# Patient Record
Sex: MALE | Race: WHITE | NOT HISPANIC OR LATINO | Employment: OTHER | URBAN - METROPOLITAN AREA
[De-identification: names, ages, dates, MRNs, and addresses within clinical notes are randomized per-mention and may not be internally consistent; named-entity substitution may affect disease eponyms.]

---

## 2020-12-03 ENCOUNTER — HOSPITAL ENCOUNTER (OUTPATIENT)
Dept: GENERAL RADIOLOGY | Facility: HOSPITAL | Age: 75
Discharge: HOME | End: 2020-12-03
Admitting: INTERNAL MEDICINE

## 2020-12-03 DIAGNOSIS — M25.561 RIGHT KNEE PAIN, UNSPECIFIED CHRONICITY: ICD-10-CM

## 2020-12-03 PROCEDURE — 73560 X-RAY EXAM OF KNEE 1 OR 2: CPT

## 2021-03-09 DIAGNOSIS — Z23 IMMUNIZATION DUE: ICD-10-CM

## 2023-05-18 ENCOUNTER — HOSPITAL ENCOUNTER (OUTPATIENT)
Dept: GENERAL RADIOLOGY | Facility: HOSPITAL | Age: 78
Discharge: HOME | End: 2023-05-18
Admitting: INTERNAL MEDICINE
Payer: MEDICARE

## 2023-05-18 DIAGNOSIS — R05.9 COUGH: ICD-10-CM

## 2023-05-18 PROCEDURE — 71046 X-RAY EXAM CHEST 2 VIEWS: CPT

## 2023-08-17 ENCOUNTER — APPOINTMENT (OUTPATIENT)
Dept: GENERAL RADIOLOGY | Facility: HOSPITAL | Age: 78
End: 2023-08-17
Payer: MEDICARE

## 2023-08-17 ENCOUNTER — HOSPITAL ENCOUNTER (EMERGENCY)
Facility: HOSPITAL | Age: 78
Discharge: HOME | End: 2023-08-17
Attending: EMERGENCY MEDICINE
Payer: MEDICARE

## 2023-08-17 VITALS
HEIGHT: 72 IN | TEMPERATURE: 97.5 F | WEIGHT: 215 LBS | OXYGEN SATURATION: 90 % | HEART RATE: 85 BPM | RESPIRATION RATE: 18 BRPM | BODY MASS INDEX: 29.12 KG/M2 | SYSTOLIC BLOOD PRESSURE: 152 MMHG | DIASTOLIC BLOOD PRESSURE: 90 MMHG

## 2023-08-17 DIAGNOSIS — N17.9 AKI (ACUTE KIDNEY INJURY): ICD-10-CM

## 2023-08-17 DIAGNOSIS — K59.00 CONSTIPATION, UNSPECIFIED CONSTIPATION TYPE: Primary | ICD-10-CM

## 2023-08-17 LAB
ALBUMIN SERPL-MCNC: 4.3 G/DL (ref 3.5–5.2)
ALBUMIN/GLOB SERPL: 1.6 G/DL
ALP SERPL-CCNC: 56 U/L (ref 39–117)
ALT SERPL W P-5'-P-CCNC: 16 U/L (ref 1–41)
ANION GAP SERPL CALCULATED.3IONS-SCNC: 14.6 MMOL/L (ref 5–15)
AST SERPL-CCNC: 23 U/L (ref 1–40)
BASOPHILS # BLD AUTO: 0.06 10*3/MM3 (ref 0–0.2)
BASOPHILS NFR BLD AUTO: 0.5 % (ref 0–1.5)
BILIRUB SERPL-MCNC: 2.1 MG/DL (ref 0–1.2)
BUN SERPL-MCNC: 24 MG/DL (ref 8–23)
BUN/CREAT SERPL: 14.7 (ref 7–25)
CALCIUM SPEC-SCNC: 8.7 MG/DL (ref 8.6–10.5)
CHLORIDE SERPL-SCNC: 100 MMOL/L (ref 98–107)
CO2 SERPL-SCNC: 21.4 MMOL/L (ref 22–29)
CREAT SERPL-MCNC: 1.63 MG/DL (ref 0.76–1.27)
DEPRECATED RDW RBC AUTO: 43.8 FL (ref 37–54)
EGFRCR SERPLBLD CKD-EPI 2021: 43.4 ML/MIN/1.73
EOSINOPHIL # BLD AUTO: 0.01 10*3/MM3 (ref 0–0.4)
EOSINOPHIL NFR BLD AUTO: 0.1 % (ref 0.3–6.2)
ERYTHROCYTE [DISTWIDTH] IN BLOOD BY AUTOMATED COUNT: 12.9 % (ref 12.3–15.4)
GLOBULIN UR ELPH-MCNC: 2.7 GM/DL
GLUCOSE SERPL-MCNC: 92 MG/DL (ref 65–99)
HCT VFR BLD AUTO: 43.5 % (ref 37.5–51)
HGB BLD-MCNC: 14.7 G/DL (ref 13–17.7)
LYMPHOCYTES # BLD AUTO: 1.38 10*3/MM3 (ref 0.7–3.1)
LYMPHOCYTES NFR BLD AUTO: 11.1 % (ref 19.6–45.3)
MCH RBC QN AUTO: 31.5 PG (ref 26.6–33)
MCHC RBC AUTO-ENTMCNC: 33.8 G/DL (ref 31.5–35.7)
MCV RBC AUTO: 93.1 FL (ref 79–97)
MONOCYTES # BLD AUTO: 1.29 10*3/MM3 (ref 0.1–0.9)
MONOCYTES NFR BLD AUTO: 10.4 % (ref 5–12)
NEUTROPHILS NFR BLD AUTO: 77.5 % (ref 42.7–76)
NEUTROPHILS NFR BLD AUTO: 9.64 10*3/MM3 (ref 1.7–7)
PLATELET # BLD AUTO: 113 10*3/MM3 (ref 140–450)
PMV BLD AUTO: 10.7 FL (ref 6–12)
POTASSIUM SERPL-SCNC: 4.2 MMOL/L (ref 3.5–5.2)
PROT SERPL-MCNC: 7 G/DL (ref 6–8.5)
RBC # BLD AUTO: 4.67 10*6/MM3 (ref 4.14–5.8)
SODIUM SERPL-SCNC: 136 MMOL/L (ref 136–145)
WBC NRBC COR # BLD: 12.43 10*3/MM3 (ref 3.4–10.8)

## 2023-08-17 PROCEDURE — 74018 RADEX ABDOMEN 1 VIEW: CPT

## 2023-08-17 PROCEDURE — 80053 COMPREHEN METABOLIC PANEL: CPT | Performed by: EMERGENCY MEDICINE

## 2023-08-17 PROCEDURE — 99283 EMERGENCY DEPT VISIT LOW MDM: CPT

## 2023-08-17 PROCEDURE — 85025 COMPLETE CBC W/AUTO DIFF WBC: CPT | Performed by: EMERGENCY MEDICINE

## 2023-08-17 RX ORDER — METOPROLOL SUCCINATE 25 MG/1
12.5 TABLET, EXTENDED RELEASE ORAL DAILY
COMMUNITY

## 2023-08-17 RX ORDER — AZELASTINE 1 MG/ML
SPRAY, METERED NASAL
COMMUNITY

## 2023-08-17 RX ORDER — APIXABAN 5 MG/1
5 TABLET, FILM COATED ORAL 2 TIMES DAILY
COMMUNITY

## 2023-08-17 RX ORDER — SODIUM CHLORIDE 0.9 % (FLUSH) 0.9 %
10 SYRINGE (ML) INJECTION AS NEEDED
Status: DISCONTINUED | OUTPATIENT
Start: 2023-08-17 | End: 2023-08-17 | Stop reason: HOSPADM

## 2023-08-17 RX ORDER — ACETAMINOPHEN 500 MG
1000 TABLET ORAL
COMMUNITY
Start: 2022-04-20

## 2023-08-17 RX ORDER — ATORVASTATIN CALCIUM 10 MG/1
10 TABLET, FILM COATED ORAL DAILY
COMMUNITY

## 2023-08-17 RX ORDER — AMIODARONE HYDROCHLORIDE 200 MG/1
TABLET ORAL
COMMUNITY
Start: 2022-04-20

## 2023-08-17 RX ADMIN — SODIUM CHLORIDE, POTASSIUM CHLORIDE, SODIUM LACTATE AND CALCIUM CHLORIDE 1000 ML: 600; 310; 30; 20 INJECTION, SOLUTION INTRAVENOUS at 13:46

## 2023-08-17 NOTE — ED TRIAGE NOTES
Patient ambulatory. States he has not been able to have a bowel movement since 8/12. Patient states he has tried stool softeners, laxatives and a fleet enema with no relief. Patient states he spoke with his PCP who advised him to come here. Patient denies taking any narcotics. States he has been straining so hard he has started to pass blood.

## 2023-08-17 NOTE — ED PROVIDER NOTES
EMERGENCY DEPARTMENT ENCOUNTER    Room Number:  34/34  PCP: Drew Landry Jr., MD      HPI:  Chief Complaint: Constipation  A complete HPI/ROS/PMH/PSH/SH/FH are unobtainable due to: None  Context: Duy Valdez is a 77 y.o. male who presents to the ED c/o constipation.  Last movement was Saturday.  On Monday he had a lithotripsy.  He has tried taking over-the-counter Senokot without improvement of symptoms.  He also tried an enema earlier today without success which is why he is here.  No abdominal pain.  No vomiting.          PAST MEDICAL HISTORY  Active Ambulatory Problems     Diagnosis Date Noted    No Active Ambulatory Problems     Resolved Ambulatory Problems     Diagnosis Date Noted    No Resolved Ambulatory Problems     Past Medical History:   Diagnosis Date    Atrial fibrillation          PAST SURGICAL HISTORY  Past Surgical History:   Procedure Laterality Date    CARDIAC ABLATION      CYSTOSCOPY BLADDER STONE LITHOTRIPSY           FAMILY HISTORY  History reviewed. No pertinent family history.      SOCIAL HISTORY  Social History     Socioeconomic History    Marital status:    Tobacco Use    Smoking status: Never   Substance and Sexual Activity    Alcohol use: Never    Drug use: Never         ALLERGIES  Patient has no known allergies.        REVIEW OF SYSTEMS  Review of Systems     All systems reviewed and negative except for those discussed in HPI.       PHYSICAL EXAM  ED Triage Vitals   Temp Heart Rate Resp BP SpO2   08/17/23 1146 08/17/23 1146 08/17/23 1146 08/17/23 1203 08/17/23 1146   97.5 øF (36.4 øC) 66 18 151/78 96 %      Temp src Heart Rate Source Patient Position BP Location FiO2 (%)   08/17/23 1146 08/17/23 1146 08/17/23 1203 08/17/23 1203 --   Tympanic Monitor Sitting Left arm        Physical Exam      GENERAL: no acute distress  HENT: nares patent  EYES: no scleral icterus  CV: regular rhythm, normal rate  RESPIRATORY: normal effort  ABDOMEN: soft, nontender  MUSCULOSKELETAL: no  deformity  NEURO: alert, moves all extremities, follows commands  PSYCH:  calm, cooperative  SKIN: warm, dry    Vital signs and nursing notes reviewed.          LAB RESULTS  Recent Results (from the past 24 hour(s))   Comprehensive Metabolic Panel    Collection Time: 08/17/23  1:47 PM    Specimen: Blood   Result Value Ref Range    Glucose 92 65 - 99 mg/dL    BUN 24 (H) 8 - 23 mg/dL    Creatinine 1.63 (H) 0.76 - 1.27 mg/dL    Sodium 136 136 - 145 mmol/L    Potassium 4.2 3.5 - 5.2 mmol/L    Chloride 100 98 - 107 mmol/L    CO2 21.4 (L) 22.0 - 29.0 mmol/L    Calcium 8.7 8.6 - 10.5 mg/dL    Total Protein 7.0 6.0 - 8.5 g/dL    Albumin 4.3 3.5 - 5.2 g/dL    ALT (SGPT) 16 1 - 41 U/L    AST (SGOT) 23 1 - 40 U/L    Alkaline Phosphatase 56 39 - 117 U/L    Total Bilirubin 2.1 (H) 0.0 - 1.2 mg/dL    Globulin 2.7 gm/dL    A/G Ratio 1.6 g/dL    BUN/Creatinine Ratio 14.7 7.0 - 25.0    Anion Gap 14.6 5.0 - 15.0 mmol/L    eGFR 43.4 (L) >60.0 mL/min/1.73   CBC Auto Differential    Collection Time: 08/17/23  1:47 PM    Specimen: Blood   Result Value Ref Range    WBC 12.43 (H) 3.40 - 10.80 10*3/mm3    RBC 4.67 4.14 - 5.80 10*6/mm3    Hemoglobin 14.7 13.0 - 17.7 g/dL    Hematocrit 43.5 37.5 - 51.0 %    MCV 93.1 79.0 - 97.0 fL    MCH 31.5 26.6 - 33.0 pg    MCHC 33.8 31.5 - 35.7 g/dL    RDW 12.9 12.3 - 15.4 %    RDW-SD 43.8 37.0 - 54.0 fl    MPV 10.7 6.0 - 12.0 fL    Platelets 113 (L) 140 - 450 10*3/mm3    Neutrophil % 77.5 (H) 42.7 - 76.0 %    Lymphocyte % 11.1 (L) 19.6 - 45.3 %    Monocyte % 10.4 5.0 - 12.0 %    Eosinophil % 0.1 (L) 0.3 - 6.2 %    Basophil % 0.5 0.0 - 1.5 %    Neutrophils, Absolute 9.64 (H) 1.70 - 7.00 10*3/mm3    Lymphocytes, Absolute 1.38 0.70 - 3.10 10*3/mm3    Monocytes, Absolute 1.29 (H) 0.10 - 0.90 10*3/mm3    Eosinophils, Absolute 0.01 0.00 - 0.40 10*3/mm3    Basophils, Absolute 0.06 0.00 - 0.20 10*3/mm3       Ordered the above labs and reviewed the results.        RADIOLOGY  XR Abdomen KUB    Result Date:  8/17/2023  XR ABDOMEN KUB-  INDICATIONS: Constipation  TECHNIQUE: SUPINE VIEWS OF THE ABDOMEN  COMPARISON: None available  FINDINGS:   The bowel gas pattern is nonobstructive. No supine evidence for free intraperitoneal gas. Moderate colonic fecal retention is apparent. No definite nephrolithiasis. Follow-up as clinically indicated.       As described.   This report was finalized on 8/17/2023 1:56 PM by Dr. Kurt Hylton M.D.       Ordered the above noted radiological studies. Reviewed by me in PACS.          PROCEDURES  Procedures          MEDICATIONS GIVEN IN ER  Medications   sodium chloride 0.9 % flush 10 mL (has no administration in time range)   lactated ringers bolus 1,000 mL (0 mL Intravenous Stopped 8/17/23 1510)         MEDICAL DECISION MAKING, PROGRESS, and CONSULTS    Discussion below represents my analysis of pertinent findings related to patient's condition, differential diagnosis, treatment plan and final disposition.      Orders placed during this visit:  Orders Placed This Encounter   Procedures    XR Abdomen KUB    Comprehensive Metabolic Panel    CBC Auto Differential    Insert Peripheral IV    CBC & Differential           Differential diagnosis:    Constipation, bowel obstruction, electrolyte abnormality leading to poor bowel motility    After initial evaluation, I considered the need for admission given his acute kidney injury.        Independent interpretation of labs, radiology studies, and discussions with consultants:  ED Course as of 08/17/23 1615   Thu Aug 17, 2023   1417 X-ray of the abdomen pelvis independently interpreted by myself.  Moderate colonic stool burden noted. [TD]   1417 Hemoglobin: 14.7 [TD]   1417 WBC(!): 12.43 [TD]   1459 I discussed the case with Dr. Landry, patient's PCP.  We reviewed the patient's most recent creatinine which was 0.8.  He recommends the patient drink some Gatorade at home and he will recheck his lab work tomorrow.  He also recommends magnesium  citrate for the patient's constipation.  I have prescribed this to the patient's pharmacy. [TD]      ED Course User Index  [TD] Kurt Martin II, MD           DIAGNOSIS  Final diagnoses:   Constipation, unspecified constipation type   MEY (acute kidney injury)         DISPOSITION  DISCHARGE    FOLLOW-UP  Drew Landry Jr., MD  [address] [id number] [phone]    Go in 1 day           Medication List        New Prescriptions      magnesium citrate solution  Take 296 mL by mouth 1 (One) Time for 1 dose.               Where to Get Your Medications        These medications were sent to [facility] [address] [place] [address] ([place]), [place] - [address] [facility] - [phone]  - [phone] FX [address] [facility] ([facility]) [place] [address]      Phone: [phone]   magnesium citrate solution             Latest Documented Vital Signs:  As of 16:15 EDT  BP- 152/90 HR- 85 Temp- 97.5 øF (36.4 øC) (Tympanic) O2 sat- 90%      --    Please note that portions of this were completed with a voice recognition program.       Note Disclaimer: At [facility], we believe that sharing information builds trust and better relationships. You are receiving this note because you are receiving care at [facility] or recently visited. It is possible you will see health information before a provider has talked with you about it. This kind of information can be easy to misunderstand. To help you fully understand what it means for your health, we urge you to discuss this note with your provider.         Kurt Martin II, MD  08/17/23 9122

## 2023-08-18 ENCOUNTER — TRANSCRIBE ORDERS (OUTPATIENT)
Dept: LAB | Facility: HOSPITAL | Age: 78
End: 2023-08-18
Payer: MEDICARE

## 2023-08-18 ENCOUNTER — LAB (OUTPATIENT)
Dept: LAB | Facility: HOSPITAL | Age: 78
End: 2023-08-18
Payer: MEDICARE

## 2023-08-18 DIAGNOSIS — N28.9 DISORDER OF KIDNEY: Primary | ICD-10-CM

## 2023-08-18 DIAGNOSIS — N28.9 DISORDER OF KIDNEY: ICD-10-CM

## 2023-08-18 LAB
ALBUMIN SERPL-MCNC: 4.2 G/DL (ref 3.5–5.2)
ANION GAP SERPL CALCULATED.3IONS-SCNC: 13.2 MMOL/L (ref 5–15)
BUN SERPL-MCNC: 23 MG/DL (ref 8–23)
BUN/CREAT SERPL: 13.7 (ref 7–25)
CALCIUM SPEC-SCNC: 8.8 MG/DL (ref 8.6–10.5)
CHLORIDE SERPL-SCNC: 99 MMOL/L (ref 98–107)
CO2 SERPL-SCNC: 24.8 MMOL/L (ref 22–29)
CREAT SERPL-MCNC: 1.68 MG/DL (ref 0.76–1.27)
EGFRCR SERPLBLD CKD-EPI 2021: 41.9 ML/MIN/1.73
GLUCOSE SERPL-MCNC: 114 MG/DL (ref 65–99)
PHOSPHATE SERPL-MCNC: 2.5 MG/DL (ref 2.5–4.5)
POTASSIUM SERPL-SCNC: 5 MMOL/L (ref 3.5–5.2)
SODIUM SERPL-SCNC: 137 MMOL/L (ref 136–145)

## 2023-08-18 PROCEDURE — 80069 RENAL FUNCTION PANEL: CPT

## 2023-08-18 PROCEDURE — 36415 COLL VENOUS BLD VENIPUNCTURE: CPT

## 2023-08-31 PROCEDURE — 82365 CALCULUS SPECTROSCOPY: CPT | Performed by: UROLOGY

## 2023-09-01 ENCOUNTER — LAB REQUISITION (OUTPATIENT)
Dept: LAB | Facility: HOSPITAL | Age: 78
End: 2023-09-01
Payer: MEDICARE

## 2023-09-01 DIAGNOSIS — N20.0 CALCULUS OF KIDNEY: ICD-10-CM

## 2023-09-11 LAB
COLOR STONE: NORMAL
COM MFR STONE: 100 %
COMPN STONE: NORMAL
LABORATORY COMMENT REPORT: NORMAL
Lab: NORMAL
Lab: NORMAL
PHOTO: NORMAL
SIZE STONE: NORMAL MM
SPEC SOURCE SUBJ: NORMAL
WT STONE: 6 MG

## 2023-10-12 ENCOUNTER — TRANSCRIBE ORDERS (OUTPATIENT)
Dept: ADMINISTRATIVE | Facility: HOSPITAL | Age: 78
End: 2023-10-12
Payer: MEDICARE

## 2023-10-12 DIAGNOSIS — J98.4 RESTRICTIVE LUNG DISEASE: Primary | ICD-10-CM

## 2023-10-20 ENCOUNTER — APPOINTMENT (OUTPATIENT)
Dept: PULMONOLOGY | Facility: HOSPITAL | Age: 78
End: 2023-10-20
Payer: MEDICARE

## 2023-10-20 ENCOUNTER — HOSPITAL ENCOUNTER (OUTPATIENT)
Dept: CT IMAGING | Facility: HOSPITAL | Age: 78
Discharge: HOME | End: 2023-10-20
Admitting: INTERNAL MEDICINE
Payer: MEDICARE

## 2023-10-20 DIAGNOSIS — J98.4 RESTRICTIVE LUNG DISEASE: ICD-10-CM

## 2023-10-20 PROCEDURE — 71250 CT THORAX DX C-: CPT

## 2023-10-22 PROCEDURE — 82820 HEMOGLOBIN-OXYGEN AFFINITY: CPT

## 2023-10-23 ENCOUNTER — HOSPITAL ENCOUNTER (OUTPATIENT)
Dept: PULMONOLOGY | Facility: HOSPITAL | Age: 78
Discharge: HOME | End: 2023-10-23
Admitting: INTERNAL MEDICINE
Payer: MEDICARE

## 2023-10-23 DIAGNOSIS — J98.4 RESTRICTIVE LUNG DISEASE: ICD-10-CM

## 2023-10-23 LAB
BDY SITE: ABNORMAL
CALCULATED HEMOGLOBIN, EPOC: 14.6 G/DL
HGB BLDA-MCNC: 14.6 G/DL (ref 14–18)
Lab: ABNORMAL
SAO2 % BLDCOA: 92 % (ref 94–99)

## 2023-10-23 PROCEDURE — 94726 PLETHYSMOGRAPHY LUNG VOLUMES: CPT

## 2023-10-23 PROCEDURE — 94060 EVALUATION OF WHEEZING: CPT

## 2023-10-23 PROCEDURE — 94729 DIFFUSING CAPACITY: CPT

## 2023-10-23 RX ORDER — ALBUTEROL SULFATE 2.5 MG/3ML
2.5 SOLUTION RESPIRATORY (INHALATION) ONCE
Status: COMPLETED | OUTPATIENT
Start: 2023-10-23 | End: 2023-10-23

## 2023-10-23 RX ADMIN — ALBUTEROL SULFATE 2.5 MG: 2.5 SOLUTION RESPIRATORY (INHALATION) at 13:04

## 2024-05-15 ENCOUNTER — APPOINTMENT (OUTPATIENT)
Dept: GENERAL RADIOLOGY | Facility: HOSPITAL | Age: 79
End: 2024-05-15
Payer: MEDICARE

## 2024-05-15 ENCOUNTER — HOSPITAL ENCOUNTER (INPATIENT)
Facility: HOSPITAL | Age: 79
LOS: 2 days | Discharge: HOME | End: 2024-05-18
Attending: INTERNAL MEDICINE | Admitting: INTERNAL MEDICINE
Payer: MEDICARE

## 2024-05-15 ENCOUNTER — TRANSCRIBE ORDERS (OUTPATIENT)
Dept: ADMINISTRATIVE | Facility: HOSPITAL | Age: 79
End: 2024-05-15
Payer: MEDICARE

## 2024-05-15 ENCOUNTER — HOSPITAL ENCOUNTER (OUTPATIENT)
Dept: CT IMAGING | Facility: HOSPITAL | Age: 79
Discharge: HOME | End: 2024-05-15
Payer: MEDICARE

## 2024-05-15 DIAGNOSIS — R10.9 ABDOMINAL PAIN, UNSPECIFIED ABDOMINAL LOCATION: Primary | ICD-10-CM

## 2024-05-15 DIAGNOSIS — K81.0 ACUTE CHOLECYSTITIS: ICD-10-CM

## 2024-05-15 DIAGNOSIS — K81.9 CHOLECYSTITIS: Primary | ICD-10-CM

## 2024-05-15 DIAGNOSIS — R10.9 ABDOMINAL PAIN, UNSPECIFIED ABDOMINAL LOCATION: ICD-10-CM

## 2024-05-15 LAB
ALBUMIN SERPL-MCNC: 4.2 G/DL (ref 3.5–5.2)
ALBUMIN/GLOB SERPL: 1.6 G/DL
ALP SERPL-CCNC: 147 U/L (ref 39–117)
ALT SERPL W P-5'-P-CCNC: 205 U/L (ref 1–41)
AMYLASE SERPL-CCNC: 77 U/L (ref 28–100)
ANION GAP SERPL CALCULATED.3IONS-SCNC: 8.5 MMOL/L (ref 5–15)
ANION GAP SERPL CALCULATED.3IONS-SCNC: 8.5 MMOL/L (ref 5–15)
AST SERPL-CCNC: 176 U/L (ref 1–40)
BASOPHILS # BLD AUTO: 0.07 10*3/MM3 (ref 0–0.2)
BASOPHILS NFR BLD AUTO: 0.8 % (ref 0–1.5)
BILIRUB SERPL-MCNC: 2 MG/DL (ref 0–1.2)
BUN SERPL-MCNC: 13 MG/DL (ref 8–23)
BUN SERPL-MCNC: 13 MG/DL (ref 8–23)
BUN/CREAT SERPL: 15.7 (ref 7–25)
BUN/CREAT SERPL: 15.7 (ref 7–25)
CALCIUM SPEC-SCNC: 9.1 MG/DL (ref 8.6–10.5)
CALCIUM SPEC-SCNC: 9.1 MG/DL (ref 8.6–10.5)
CHLORIDE SERPL-SCNC: 104 MMOL/L (ref 98–107)
CHLORIDE SERPL-SCNC: 104 MMOL/L (ref 98–107)
CO2 SERPL-SCNC: 23.5 MMOL/L (ref 22–29)
CO2 SERPL-SCNC: 23.5 MMOL/L (ref 22–29)
CREAT BLDA-MCNC: 0.7 MG/DL (ref 0.6–1.3)
CREAT SERPL-MCNC: 0.83 MG/DL (ref 0.76–1.27)
CREAT SERPL-MCNC: 0.83 MG/DL (ref 0.76–1.27)
DEPRECATED RDW RBC AUTO: 39.8 FL (ref 37–54)
EGFRCR SERPLBLD CKD-EPI 2021: 90.1 ML/MIN/1.73
EGFRCR SERPLBLD CKD-EPI 2021: 90.1 ML/MIN/1.73
EOSINOPHIL # BLD AUTO: 0.1 10*3/MM3 (ref 0–0.4)
EOSINOPHIL NFR BLD AUTO: 1.2 % (ref 0.3–6.2)
ERYTHROCYTE [DISTWIDTH] IN BLOOD BY AUTOMATED COUNT: 12.2 % (ref 12.3–15.4)
GLOBULIN UR ELPH-MCNC: 2.7 GM/DL
GLUCOSE SERPL-MCNC: 82 MG/DL (ref 65–99)
GLUCOSE SERPL-MCNC: 82 MG/DL (ref 65–99)
HCT VFR BLD AUTO: 43.6 % (ref 37.5–51)
HGB BLD-MCNC: 14.9 G/DL (ref 13–17.7)
IMM GRANULOCYTES # BLD AUTO: 0.08 10*3/MM3 (ref 0–0.05)
IMM GRANULOCYTES NFR BLD AUTO: 1 % (ref 0–0.5)
LIPASE SERPL-CCNC: 31 U/L (ref 13–60)
LYMPHOCYTES # BLD AUTO: 1.35 10*3/MM3 (ref 0.7–3.1)
LYMPHOCYTES NFR BLD AUTO: 16.3 % (ref 19.6–45.3)
MCH RBC QN AUTO: 31.2 PG (ref 26.6–33)
MCHC RBC AUTO-ENTMCNC: 34.2 G/DL (ref 31.5–35.7)
MCV RBC AUTO: 91.2 FL (ref 79–97)
MONOCYTES # BLD AUTO: 0.7 10*3/MM3 (ref 0.1–0.9)
MONOCYTES NFR BLD AUTO: 8.5 % (ref 5–12)
NEUTROPHILS NFR BLD AUTO: 5.98 10*3/MM3 (ref 1.7–7)
NEUTROPHILS NFR BLD AUTO: 72.2 % (ref 42.7–76)
PLATELET # BLD AUTO: 121 10*3/MM3 (ref 140–450)
PMV BLD AUTO: 10.5 FL (ref 6–12)
POTASSIUM SERPL-SCNC: 4.2 MMOL/L (ref 3.5–5.2)
POTASSIUM SERPL-SCNC: 4.2 MMOL/L (ref 3.5–5.2)
PROT SERPL-MCNC: 6.9 G/DL (ref 6–8.5)
RBC # BLD AUTO: 4.78 10*6/MM3 (ref 4.14–5.8)
SODIUM SERPL-SCNC: 136 MMOL/L (ref 136–145)
SODIUM SERPL-SCNC: 136 MMOL/L (ref 136–145)
WBC NRBC COR # BLD AUTO: 8.28 10*3/MM3 (ref 3.4–10.8)

## 2024-05-15 PROCEDURE — 85025 COMPLETE CBC W/AUTO DIFF WBC: CPT | Performed by: INTERNAL MEDICINE

## 2024-05-15 PROCEDURE — 82150 ASSAY OF AMYLASE: CPT | Performed by: INTERNAL MEDICINE

## 2024-05-15 PROCEDURE — G0378 HOSPITAL OBSERVATION PER HR: HCPCS

## 2024-05-15 PROCEDURE — 83690 ASSAY OF LIPASE: CPT | Performed by: INTERNAL MEDICINE

## 2024-05-15 PROCEDURE — 71046 X-RAY EXAM CHEST 2 VIEWS: CPT

## 2024-05-15 PROCEDURE — 25010000002 PIPERACILLIN SOD-TAZOBACTAM PER 1 G: Performed by: INTERNAL MEDICINE

## 2024-05-15 PROCEDURE — 82565 ASSAY OF CREATININE: CPT

## 2024-05-15 PROCEDURE — 87040 BLOOD CULTURE FOR BACTERIA: CPT | Performed by: INTERNAL MEDICINE

## 2024-05-15 PROCEDURE — 74177 CT ABD & PELVIS W/CONTRAST: CPT

## 2024-05-15 PROCEDURE — 25810000003 SODIUM CHLORIDE 0.9 % SOLUTION: Performed by: INTERNAL MEDICINE

## 2024-05-15 PROCEDURE — 93005 ELECTROCARDIOGRAM TRACING: CPT | Performed by: INTERNAL MEDICINE

## 2024-05-15 PROCEDURE — 25510000001 IOPAMIDOL 61 % SOLUTION: Performed by: INTERNAL MEDICINE

## 2024-05-15 PROCEDURE — 93010 ELECTROCARDIOGRAM REPORT: CPT | Performed by: INTERNAL MEDICINE

## 2024-05-15 PROCEDURE — 80053 COMPREHEN METABOLIC PANEL: CPT | Performed by: INTERNAL MEDICINE

## 2024-05-15 RX ORDER — AMLODIPINE BESYLATE 2.5 MG/1
2.5 TABLET ORAL DAILY
COMMUNITY

## 2024-05-15 RX ORDER — ATORVASTATIN CALCIUM 20 MG/1
80 TABLET, FILM COATED ORAL DAILY
Status: DISCONTINUED | OUTPATIENT
Start: 2024-05-16 | End: 2024-05-18 | Stop reason: HOSPADM

## 2024-05-15 RX ORDER — METOPROLOL SUCCINATE 25 MG/1
25 TABLET, EXTENDED RELEASE ORAL
Status: DISCONTINUED | OUTPATIENT
Start: 2024-05-15 | End: 2024-05-18 | Stop reason: HOSPADM

## 2024-05-15 RX ORDER — SODIUM CHLORIDE 0.9 % (FLUSH) 0.9 %
10 SYRINGE (ML) INJECTION AS NEEDED
Status: DISCONTINUED | OUTPATIENT
Start: 2024-05-15 | End: 2024-05-18 | Stop reason: HOSPADM

## 2024-05-15 RX ORDER — FLUTICASONE FUROATE 100 UG/1
1 POWDER RESPIRATORY (INHALATION) DAILY
COMMUNITY

## 2024-05-15 RX ORDER — ALBUTEROL SULFATE 2.5 MG/3ML
2.5 SOLUTION RESPIRATORY (INHALATION) EVERY 6 HOURS PRN
Status: DISCONTINUED | OUTPATIENT
Start: 2024-05-15 | End: 2024-05-18 | Stop reason: HOSPADM

## 2024-05-15 RX ORDER — SODIUM CHLORIDE 0.9 % (FLUSH) 0.9 %
10 SYRINGE (ML) INJECTION EVERY 12 HOURS SCHEDULED
Status: DISCONTINUED | OUTPATIENT
Start: 2024-05-15 | End: 2024-05-18 | Stop reason: HOSPADM

## 2024-05-15 RX ORDER — ACETAMINOPHEN 325 MG/1
650 TABLET ORAL EVERY 4 HOURS PRN
Status: DISCONTINUED | OUTPATIENT
Start: 2024-05-15 | End: 2024-05-18 | Stop reason: HOSPADM

## 2024-05-15 RX ORDER — SODIUM CHLORIDE 9 MG/ML
40 INJECTION, SOLUTION INTRAVENOUS AS NEEDED
Status: DISCONTINUED | OUTPATIENT
Start: 2024-05-15 | End: 2024-05-18 | Stop reason: HOSPADM

## 2024-05-15 RX ORDER — HYDROMORPHONE HYDROCHLORIDE 1 MG/ML
0.5 INJECTION, SOLUTION INTRAMUSCULAR; INTRAVENOUS; SUBCUTANEOUS
Status: DISCONTINUED | OUTPATIENT
Start: 2024-05-15 | End: 2024-05-18 | Stop reason: HOSPADM

## 2024-05-15 RX ORDER — ONDANSETRON 2 MG/ML
4 INJECTION INTRAMUSCULAR; INTRAVENOUS EVERY 6 HOURS PRN
Status: DISCONTINUED | OUTPATIENT
Start: 2024-05-15 | End: 2024-05-18 | Stop reason: HOSPADM

## 2024-05-15 RX ORDER — SODIUM CHLORIDE 9 MG/ML
75 INJECTION, SOLUTION INTRAVENOUS CONTINUOUS
Status: DISCONTINUED | OUTPATIENT
Start: 2024-05-15 | End: 2024-05-16

## 2024-05-15 RX ORDER — NALOXONE HCL 0.4 MG/ML
0.4 VIAL (ML) INJECTION
Status: DISCONTINUED | OUTPATIENT
Start: 2024-05-15 | End: 2024-05-18 | Stop reason: HOSPADM

## 2024-05-15 RX ADMIN — METOPROLOL SUCCINATE 12.5 MG: 25 TABLET, EXTENDED RELEASE ORAL at 23:14

## 2024-05-15 RX ADMIN — Medication 10 ML: at 23:15

## 2024-05-15 RX ADMIN — SODIUM CHLORIDE 75 ML/HR: 9 INJECTION, SOLUTION INTRAVENOUS at 23:14

## 2024-05-15 RX ADMIN — IOPAMIDOL 85 ML: 612 INJECTION, SOLUTION INTRAVENOUS at 17:14

## 2024-05-15 RX ADMIN — PIPERACILLIN AND TAZOBACTAM 3.38 G: 3; .375 INJECTION, POWDER, FOR SOLUTION INTRAVENOUS at 23:16

## 2024-05-16 ENCOUNTER — APPOINTMENT (OUTPATIENT)
Dept: ULTRASOUND IMAGING | Facility: HOSPITAL | Age: 79
End: 2024-05-16
Payer: MEDICARE

## 2024-05-16 PROBLEM — K81.0 ACUTE CHOLECYSTITIS: Status: ACTIVE | Noted: 2024-05-16

## 2024-05-16 LAB
QT INTERVAL: 402 MS
QTC INTERVAL: 455 MS

## 2024-05-16 PROCEDURE — 76705 ECHO EXAM OF ABDOMEN: CPT

## 2024-05-16 PROCEDURE — 99223 1ST HOSP IP/OBS HIGH 75: CPT | Performed by: SURGERY

## 2024-05-16 PROCEDURE — 25010000002 PIPERACILLIN SOD-TAZOBACTAM PER 1 G: Performed by: INTERNAL MEDICINE

## 2024-05-16 RX ADMIN — Medication 10 ML: at 21:38

## 2024-05-16 RX ADMIN — Medication 10 ML: at 08:37

## 2024-05-16 RX ADMIN — PIPERACILLIN AND TAZOBACTAM 3.38 G: 3; .375 INJECTION, POWDER, FOR SOLUTION INTRAVENOUS at 16:52

## 2024-05-16 RX ADMIN — ATORVASTATIN CALCIUM 80 MG: 20 TABLET, FILM COATED ORAL at 08:37

## 2024-05-16 NOTE — PLAN OF CARE
Goal Outcome Evaluation:           Progress: no change  Patient admitted overnight. NS infusing at 75, IV abx administered. No complaints of pain overnight. CXR and EKG obtained per orders. US of gallbladder done this am. Pt NPO since midnight. SCDs on. Wife at bedside. Vitals stable on RA.

## 2024-05-16 NOTE — PROGRESS NOTES
[facility] Clinical Pharmacy Services: Piperacillin-Tazobactam Consult    Pt Name: Duy Valdez   : 1945    Indication: Intra-Abdominal Infection    Relevant clinical data and objective history reviewed:    Past Medical History:   Diagnosis Date    Atrial fibrillation      Creatinine   Date Value Ref Range Status   05/15/2024 0.70 0.60 - 1.30 mg/dL Final     Comment:     Serial Number: [id number]:  [id number]   2023 1.68 (H) 0.76 - 1.27 mg/dL Final   2023 1.63 (H) 0.76 - 1.27 mg/dL Final   2018 0.7 0.7 - 1.5 mg/dL Final   2018 0.9 0.7 - 1.5 mg/dL Final   2018 0.7 0.7 - 1.5 mg/dL Final     BUN   Date Value Ref Range Status   2023 23 8 - 23 mg/dL Final   2018 20 7 - 20 mg/dL Final     Estimated Creatinine Clearance: 107 mL/min (by C-G formula based on SCr of 0.7 mg/dL).    Lab Results   Component Value Date    WBC 12.43 (H) 2023     Temp Readings from Last 3 Encounters:   23 97.5 °F (36.4 °C) (Tympanic)      Assessment/Plan  Estimated CrCl >20 mL/min at this time; BMI 29.15 kg/m2  Will start piperacillin-tazobactam 3.375 g IV every 8 hours     Pharmacy will continue to follow daily while on piperacillin-tazobactam and adjust as needed. Thank you for this consult.    Denys Bailey [facility]  Clinical Pharmacist

## 2024-05-16 NOTE — H&P (VIEW-ONLY)
General Surgery H&P/Consultation      Impression/Plan: 78-year-old gentleman with signs and symptoms most consistent with acute cholecystitis.  His pain is improving and based on his transaminases and bilirubin trend I suspect he could have passed a stone.  I have recommended proceeding with laparoscopic cholecystectomy with intraoperative cholangiogram.  I recommend doing this on 5/17/2024 to allow 48 hours from last dose of Eliquis.  Risk and rationale for the surgery were discussed with him and he is in agreement with proceeding.  He is okay for a clear liquid diet today.  I will make him n.p.o. after midnight.    CC: Abdominal pain    HPI:   Mr. Duy Valdez is a 78 y.o. male that presented to the hospital with acute onset of upper abdominal pain at 7 AM yesterday.  It started in the epigastrium with some radiation to the left upper quadrant.  It was initially severe in intensity and then started to ease up.  He underwent a CT scan as an outpatient concerning for cholecystitis.  He was admitted to the hospital by Dr. Landry for further management.  Currently his pain is much improved.  Denies any nausea or emesis.  He takes Eliquis for history of atrial fibrillation.  His last dose was yesterday morning.    Past Medical History:   Past Medical History:   Diagnosis Date    Atrial fibrillation        Past Surgical History:   Past Surgical History:   Procedure Laterality Date    CARDIAC ABLATION      CYSTOSCOPY BLADDER STONE LITHOTRIPSY         Medications:  Medications Prior to Admission   Medication Sig Dispense Refill Last Dose    acetaminophen (TYLENOL) 500 MG tablet 2 tablets.   Past Week    amLODIPine (NORVASC) 2.5 MG tablet Take 1 tablet by mouth Daily.   5/15/2024 at 0700    atorvastatin (LIPITOR) 10 MG tablet Take 8 tablets by mouth Daily.   5/15/2024 at 0700    azelastine (ASTELIN) 0.1 % nasal spray azelastine 137 mcg (0.1 %) nasal spray aerosol   USE 1 SPRAY IN EACH NOSTRIL TWICE DAILY   5/14/2024     Cholecalciferol 125 MCG (5000 UT) tablet cholecalciferol (vitamin D3) 125 mcg (5,000 unit) tablet   TAKE 1 TABLET BY MOUTH ONCE DAILY   5/15/2024 at 0700    cyanocobalamin (VITAMIN B-12) 250 MCG tablet B12   5/15/2024 at 0700    Eliquis 5 MG tablet tablet Take 1 tablet by mouth 2 (Two) Times a Day.   5/15/2024 at 0700    Fluticasone Furoate (Arnuity Ellipta) 100 MCG/ACT aerosol powder  Inhale 1 puff Daily.   5/15/2024 at 0700    metoprolol succinate XL (TOPROL-XL) 25 MG 24 hr tablet 0.5 tablets Daily.   5/14/2024    amiodarone (PACERONE) 200 MG tablet amiodarone 200 mg tablet   TAKE 1 TABLET BY MOUTH ONCE DAILY            Current Facility-Administered Medications:     acetaminophen (TYLENOL) tablet 650 mg, 650 mg, Oral, Q4H PRN, Drew Landry Jr., MD    albuterol (PROVENTIL) nebulizer solution 0.083% 2.5 mg/3mL, 2.5 mg, Nebulization, Q6H PRN, Drew Landry Jr., MD    atorvastatin (LIPITOR) tablet 80 mg, 80 mg, Oral, Daily, Drew Landry Jr., MD    Calcium Replacement - Follow Nurse / BPA Driven Protocol, , Does not apply, PRN, Drew Landry Jr., MD    HYDROmorphone (DILAUDID) injection 0.5 mg, 0.5 mg, Intravenous, Q2H PRN **AND** naloxone (NARCAN) injection 0.4 mg, 0.4 mg, Intravenous, Q5 Min PRN, Drew Landry Jr., MD    Magnesium Standard Dose Replacement - Follow Nurse / BPA Driven Protocol, , Does not apply, PRNGris Felix Albert Jr., MD    metoprolol succinate XL (TOPROL-XL) 24 hr tablet 25 mg, 25 mg, Oral, Q24H, Drew Landry Jr., MD, 12.5 mg at 05/15/24 2314    ondansetron (ZOFRAN) injection 4 mg, 4 mg, Intravenous, Q6H PRN, Drew Landry Jr., MD    Pharmacy to Dose Zosyn, , Does not apply, Continuous PRN, Drew Landry Jr., MD    Phosphorus Replacement - Follow Nurse / BPA Driven Protocol, , Does not apply, PRNGris Felix Albert Jr., MD    piperacillin-tazobactam (ZOSYN) 3.375 g IVPB in 100 mL NS MBP (CD), 3.375 g, Intravenous, Q8H, Drew Landry  MD Lencho    Potassium Replacement - Follow Nurse / BPA Driven Protocol, , Does not apply, Gris RICH Felix Albert Jr., MD    sodium chloride 0.9 % flush 10 mL, 10 mL, Intravenous, Q12H, Drew Landry Jr., MD, 10 mL at 05/15/24 2315    sodium chloride 0.9 % flush 10 mL, 10 mL, Intravenous, PRNGris Felix Albert Jr., MD    sodium chloride 0.9 % infusion 40 mL, 40 mL, Intravenous, PRNGris Felix Albert Jr., MD     Allergies: No Known Allergies    Social History:   Social History     Socioeconomic History    Marital status:    Tobacco Use    Smoking status: Former     Types: Cigarettes   Vaping Use    Vaping status: Never Used   Substance and Sexual Activity    Alcohol use: Never    Drug use: Never       Family History:   History reviewed. No pertinent family history.    Review of Systems:  Clinically relevant review of systems completed and documented in HPI    Physical Exam:   Vitals:    05/16/24 0528   BP: 121/69   Pulse: 57   Resp: 16   Temp: 98.1 °F (36.7 °C)   SpO2: 98%     BMI: Body mass index is 29.98 kg/m².   97.5 kg (214 lb 15.2 oz)      Intake/Output Summary (Last 24 hours) at 5/16/2024 0756  Last data filed at 5/16/2024 0655  Gross per 24 hour   Intake 816.25 ml   Output 250 ml   Net 566.25 ml       GENERAL: no acute distress, awake and alert  RESPIRATORY: symmetric excursion bilaterally, normal work of breathing  CARDIOVASCULAR: Regular rate, well perfused  GASTROINTESTINAL: Soft, minimal tenderness in the upper abdomen, nondistended      Pertinent labs:   Results from last 7 days   Lab Units 05/15/24  2219   WBC 10*3/mm3 8.28   HEMOGLOBIN g/dL 14.9   HEMATOCRIT % 43.6   PLATELETS 10*3/mm3 121*     Results from last 7 days   Lab Units 05/15/24  2219 05/15/24  1710   SODIUM mmol/L 136  136  --    POTASSIUM mmol/L 4.2  4.2  --    CHLORIDE mmol/L 104  104  --    CO2 mmol/L 23.5  23.5  --    BUN mg/dL 13  13  --    CREATININE mg/dL 0.83  0.83 0.70   CALCIUM mg/dL 9.1  9.1  --     BILIRUBIN mg/dL 2.0*  --    ALK PHOS U/L 147*  --    ALT (SGPT) U/L 205*  --    AST (SGOT) U/L 176*  --    GLUCOSE mg/dL 82  82  --        IMAGING:  CT abdomen pelvis reviewed.  I compared this to the CT chest from 10/20/2023.  There is edema around the wall of the gallbladder which is new compared to the previous scan.  Remainder of findings and the radiology report reviewed and no acute abnormalities otherwise.              Fabio Foster MD  General and Endoscopic Surgery  [facility]    [address], [address]  [place] [address]  P: [phone]  F: [phone]

## 2024-05-16 NOTE — PLAN OF CARE
Goal Outcome Evaluation:  Plan of Care Reviewed With: patient        Progress: no change     Pt AxOx4, calm and cooperative. Takes pills whole with water, see MAR. Pt sitting up in the recliner most of the day, stand by assist to the bathroom. Family visiting this afternoon, attentive to pt. Plan NPO after Midnight for possible surgery tomorrow. Plan of care ongoing.

## 2024-05-16 NOTE — PROGRESS NOTES
History:   Patient resting comfortably.   Did not require any pain or nausea meds last night.     Allergies  Patient has no known allergies.      Current Facility-Administered Medications:     acetaminophen (TYLENOL) tablet 650 mg, 650 mg, Oral, Q4H PRN, Drew Landry Jr., MD    albuterol (PROVENTIL) nebulizer solution 0.083% 2.5 mg/3mL, 2.5 mg, Nebulization, Q6H PRN, Drew Landry Jr., MD    atorvastatin (LIPITOR) tablet 80 mg, 80 mg, Oral, Daily, Drew Landry Jr., MD    Calcium Replacement - Follow Nurse / BPA Driven Protocol, , Does not apply, PRNGris Felix Albert Jr., MD    HYDROmorphone (DILAUDID) injection 0.5 mg, 0.5 mg, Intravenous, Q2H PRN **AND** naloxone (NARCAN) injection 0.4 mg, 0.4 mg, Intravenous, Q5 Min PRN, Drew Landry Jr., MD    Magnesium Standard Dose Replacement - Follow Nurse / BPA Driven Protocol, , Does not apply, PRN, Drew Landry Jr., MD    metoprolol succinate XL (TOPROL-XL) 24 hr tablet 25 mg, 25 mg, Oral, Q24H, Drew Landry Jr., MD, 12.5 mg at 05/15/24 2314    ondansetron (ZOFRAN) injection 4 mg, 4 mg, Intravenous, Q6H PRN, Drew Landry Jr., MD    Pharmacy to Dose Zosyn, , Does not apply, Continuous PRN, Drew Landry Jr., MD    Phosphorus Replacement - Follow Nurse / BPA Driven Protocol, , Does not apply, PRN, Drew Landry Jr., MD    piperacillin-tazobactam (ZOSYN) 3.375 g IVPB in 100 mL NS MBP (CD), 3.375 g, Intravenous, Q8H, Drew Landry Jr., MD    Potassium Replacement - Follow Nurse / BPA Driven Protocol, , Does not apply, PRNGris Felix Albert Jr., MD    sodium chloride 0.9 % flush 10 mL, 10 mL, Intravenous, Q12H, Drew Landry Jr., MD, 10 mL at 05/15/24 2315    sodium chloride 0.9 % flush 10 mL, 10 mL, Intravenous, PRN, Drew Landry Jr., MD    sodium chloride 0.9 % infusion 40 mL, 40 mL, Intravenous, PRN, Drew Landry Jr., MD    /69 (BP Location: Right arm, Patient Position: Lying)   " Pulse 57   Temp 98.1 °F (36.7 °C) (Oral)   Resp 16   Ht 180.3 cm (71\")   Wt 97.5 kg (214 lb 15.2 oz)   SpO2 98%   BMI 29.98 kg/m²     Physical Exam  General: Very pleasant elderly white male in NAD  Neck: JVD physiologic.  No lymphadenopathy.  Heart: Regular rate, irregularly irregular rhythm.  Lungs: Clear to auscultation.  Abdomen: Protuberant, soft, bowel sounds are present.  Nontender. No rebound or guarding.  Back: No percussion tenderness of the spine.  Skin: No rash.  Numerous scattered seborrheic keratoses over the back.  Extremities: No edema.  Osteoarthritic changes.  Neurologic exam: Nonfocal  Lab Results (last 24 hours)       Procedure Component Value Units Date/Time    Comprehensive Metabolic Panel [[id number]]  (Abnormal) Collected: 05/15/24 2219    Specimen: Blood Updated: 05/15/24 2256     Glucose 82 mg/dL      BUN 13 mg/dL      Creatinine 0.83 mg/dL      Sodium 136 mmol/L      Potassium 4.2 mmol/L      Chloride 104 mmol/L      CO2 23.5 mmol/L      Calcium 9.1 mg/dL      Total Protein 6.9 g/dL      Albumin 4.2 g/dL      ALT (SGPT) 205 U/L      AST (SGOT) 176 U/L      Alkaline Phosphatase 147 U/L      Total Bilirubin 2.0 mg/dL      Globulin 2.7 gm/dL      A/G Ratio 1.6 g/dL      BUN/Creatinine Ratio 15.7     Anion Gap 8.5 mmol/L      eGFR 90.1 mL/min/1.73     Narrative:      GFR Normal >60  Chronic Kidney Disease <60  Kidney Failure <15    The GFR formula is only valid for adults with stable renal function between ages 18 and 70.    Lipase [[id number]]  (Normal) Collected: 05/15/24 2219    Specimen: Blood Updated: 05/15/24 2256     Lipase 31 U/L     Amylase [[id number]]  (Normal) Collected: 05/15/24 2219    Specimen: Blood Updated: 05/15/24 2256     Amylase 77 U/L     Basic Metabolic Panel [[id number]]  (Normal) Collected: 05/15/24 2219    Specimen: Blood Updated: 05/15/24 2256     Glucose 82 mg/dL      BUN 13 mg/dL      Creatinine 0.83 mg/dL      Sodium 136 mmol/L      Potassium 4.2 mmol/L      " Chloride 104 mmol/L      CO2 23.5 mmol/L      Calcium 9.1 mg/dL      BUN/Creatinine Ratio 15.7     Anion Gap 8.5 mmol/L      eGFR 90.1 mL/min/1.73     Narrative:      GFR Normal >60  Chronic Kidney Disease <60  Kidney Failure <15    The GFR formula is only valid for adults with stable renal function between ages 18 and 70.    CBC Auto Differential [[id number]]  (Abnormal) Collected: 05/15/24 2219    Specimen: Blood Updated: 05/15/24 2249     WBC 8.28 10*3/mm3      RBC 4.78 10*6/mm3      Hemoglobin 14.9 g/dL      Hematocrit 43.6 %      MCV 91.2 fL      MCH 31.2 pg      MCHC 34.2 g/dL      RDW 12.2 %      RDW-SD 39.8 fl      MPV 10.5 fL      Platelets 121 10*3/mm3      Neutrophil % 72.2 %      Lymphocyte % 16.3 %      Monocyte % 8.5 %      Eosinophil % 1.2 %      Basophil % 0.8 %      Immature Grans % 1.0 %      Neutrophils, Absolute 5.98 10*3/mm3      Lymphocytes, Absolute 1.35 10*3/mm3      Monocytes, Absolute 0.70 10*3/mm3      Eosinophils, Absolute 0.10 10*3/mm3      Basophils, Absolute 0.07 10*3/mm3      Immature Grans, Absolute 0.08 10*3/mm3     Blood Culture - Blood, Arm, Left [[id number]] Collected: 05/15/24 2219    Specimen: Blood from Arm, Left Updated: 05/15/24 2226    Blood Culture - Blood, Arm, Right [[id number]] Collected: 05/15/24 2221    Specimen: Blood from Arm, Right Updated: 05/15/24 2226          CT ABD / PELVIS  IMPRESSION:  1. Gallbladder wall thickening with cholelithiasis. This may represent  cholecystitis and findings need correlation with clinical data and could  be further evaluated with right upper quadrant sonogram.  2. Multiple 1 cm and smaller hepatic low-density lesions are most likely  cysts or hemangiomas though not definitively characterized. 1 cm lesion  superior aspect of the lateral segment left lobe of the liver not  clearly demonstrated on previous CT 10/20/2023.  3. Multiple subcentimeter renal cysts and renal lesions that are too  small to characterize.  4. Three 7 mm and smaller  right lower lobe noncalcified pulmonary  nodules, new when compared to CT chest 10/20/2023. These are most likely  inflammatory or infectious in etiology though recommend follow-up with  noncontrast chest CT in 2 to 3 months.  5. Prostate gland enlargement.  6. Chronic-appearing compression deformity at L5. Previous  posterolateral instrumented fusion at L3-4.          Imp:  Cholecystitis with elevated LFTs;  Appreciate Dr. Foster's expertise. Eliquis on hold.  Surgery scheduled for tomorrow.    Hypertension, controlled.    Atrial Fib;  Rate controlled.  Eliquis held    New Noncalcified RLL Pulmonary Nodules;  Will require followup CT scan in 2-3  months      Plan:  Continue Zosyn  Clear liquid diet.  No eliquis.  NPO after midnight.  Surgery tomorrow.       Drew Landry Jr., MD  5/16/2024  07:54 EDT

## 2024-05-16 NOTE — H&P
History and physical admission note: Observation status    Chief complaint: Abdominal pain    HPI: 78-year-old male with history of paroxysmal atrial fibrillation, hypertension, kidney stones, and obesity presented to my office today with the acute onset of abdominal pain.  He felt great last night when he went to bed.  However this morning at 7 AM he awoke with severe pain in his upper abdomen, above the umbilicus.  It was in the epigastric area and to the left upper quadrant.  It was a crampy severe pain.  He describes the pain as 8/10 in intensity.  There was no nausea or vomiting.  No diarrhea or constipation.  Did not feel like indigestion.  It would not go away.  When he presented to the office he was still having discomfort that was 5/10 in intensity.  Physical exam revealed mild upper abdominal tenderness.  No rebound or guarding.  Urinalysis was clear.  He was given Pepcid AC in the office and this had no effect on the pain.  We sent him for CT scan of the abdomen.    CT scan is suggestive of cholelithiasis with possible cholecystitis.  At the present time (7 PM) the pain is very mild, but still there.  He requires observation stay.       Allergies  Patient has no known allergies.      HOME MEDICATIONS:  Albuterol HFA MDI inhaler; 2 puffs every 4 hours as needed  Amlodipine 2.5 mg p.o. daily  Arnuity elliptica 100 mcg 1 puff daily  Atorvastatin 80 mg p.o. daily  Astelin nasal spray as needed.  Vitamin D3 5000 units daily  Eliquis 5 mg every 12 hours (last dose was this morning)  Meclizine 12.5 every 4 hours as needed dizziness  Metoprolol succinate ER 25 mg; 1/2 tablet nightly      Current Facility-Administered Medications:     acetaminophen (TYLENOL) tablet 650 mg, 650 mg, Oral, Q4H PRN, Drew Landry Jr., MD    albuterol (PROVENTIL) nebulizer solution 0.083% 2.5 mg/3mL, 2.5 mg, Nebulization, Q6H PRN, Drew Landry Jr., MD    [START ON 5/16/2024] atorvastatin (LIPITOR) tablet 80 mg, 80 mg,  Oral, Daily, Drew Landry Jr., MD    Calcium Replacement - Follow Nurse / BPA Driven Protocol, , Does not apply, PRNGris Felix Albert Jr., MD    HYDROmorphone (DILAUDID) injection 0.5 mg, 0.5 mg, Intravenous, Q2H PRN **AND** naloxone (NARCAN) injection 0.4 mg, 0.4 mg, Intravenous, Q5 Min PRN, Drew Landry Jr., MD    Magnesium Standard Dose Replacement - Follow Nurse / BPA Driven Protocol, , Does not apply, PRNGris Felix Albert Jr., MD    metoprolol succinate XL (TOPROL-XL) 24 hr tablet 25 mg, 25 mg, Oral, Q24H, Drew Landry Jr., MD    ondansetron (ZOFRAN) injection 4 mg, 4 mg, Intravenous, Q6H PRN, Drew Landry Jr., MD    Pharmacy to Dose Zosyn, , Does not apply, Continuous PRN, Drew Landry Jr., MD    Phosphorus Replacement - Follow Nurse / BPA Driven Protocol, , Does not apply, PRNGris Felix Albert Jr., MD    piperacillin-tazobactam (ZOSYN) 3.375 g IVPB in 100 mL NS MBP (CD), 3.375 g, Intravenous, Once, Drew Landry Jr., MD    [START ON 5/16/2024] piperacillin-tazobactam (ZOSYN) 3.375 g IVPB in 100 mL NS MBP (CD), 3.375 g, Intravenous, Q8H, Drew Landry Jr., MD    Potassium Replacement - Follow Nurse / BPA Driven Protocol, , Does not apply, PRN, Drew Landry Jr., MD    sodium chloride 0.9 % flush 10 mL, 10 mL, Intravenous, Q12H, Drew Landry Jr., MD    sodium chloride 0.9 % flush 10 mL, 10 mL, Intravenous, PRN, Drew Landry Jr., MD    sodium chloride 0.9 % infusion 40 mL, 40 mL, Intravenous, PRNGris Felix Albert Jr., MD    sodium chloride 0.9 % infusion, 75 mL/hr, Intravenous, Continuous, Drew Landry Jr., MD        MEDICAL PROBLEMS:    Acute cholecystitis; atypical abdominal pain with CT scan suggestive of cholecystitis.  See above    PRESLEY; patient recently diagnosed with sleep apnea.  Mild-moderate sleep apnea.  He is being set up for CPAP.    Hypertension    Calcium oxalate renal stones; patient had stent placement in  October 2023.  This has since been removed.  Followed by Dr. Hammer, urology    Obesity    Age-related macular degeneration left eye; followed by Dr. Marquez, ophthalmology    BPH; followed by Dr. Hammer, urology.    Paroxysmal atrial fibrillation; followed by Dr. Solomon, audiology.  Transesophageal echocardiogram April 2022 revealed mild left atrial and right atrial enlargement.  Mild tricuspid regurg.  Left ventricular ejection fraction 50-55%.  Patient underwent ablation via pulmonary vein isolation September 2022.  Patient was treated with metoprolol and Eliquis.    Multiple basal cell carcinoma; followed by Dr. Freeman, dermatology    Vitamin D deficiency    Osteoarthritis    Coronary atherosclerosis; patient had a positive DVT scan with a total calcium score 3.7.    Hyperlipidemia    Allergic rhinitis/asthma      SURGERIES:  Repair of left tib/fib fracture 2020.  Plate was placed  Lower back surgery with instrumentation pedro placement with screw; L4-L5 2018.  Dr. Jt Richter and Dr. Bonilla, neurosurgery.  LASEK on both eyes for nearsightedness 2002.  Multiple basal cell skin cancer removed by Dr. Tolliver.  Left shoulder replacement 1997; metal prosthesis in place  Appendectomy at 8 years of age.  Tonsillectomy as a child.      INTERVENTIONS:  Colonoscopy November 2014; negative.  Dr. Tesfaye.  Recommended repeat in 10 years  Influenza vaccine September 2022.  Pneumovax October 2018.  Shingrix November 2019 and January 2020.      SOCIAL HISTORY:  Patient is  lives with his wife.  He is a former smoker.  He has 13-pack-year history.  He quit in 1976.  He drinks occasional glass of wine.  Drinks 1 cup of coffee a day.  Patient has a masters degree in social work.  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  He has 1 stepson.      FAMILY HISTORY:  Lung cancer/mother at age 66.  CVA/mother at age 71.  Breast cancer/sister at age 68.  Myocardial infarction/maternal uncle at 63.  Suicide/father  "at 33.  Maternal grandmother diabetes mellitus  No history of thyroid disease, gallstones, aneurysm, hypertension, or colon polyps.      REVIEW OF SYSTEMS:  General: Patient was feeling good until this morning.  His appetite was good.  He is getting exercise.  He has not had any fever, chills, or sweats.  His energy levels been fair.  He has had some daytime somnolence.  He is planning on starting CPAP soon.  He was planning on leaving [facility] next week to go to [place] for a 2-week vacation.  Head: No headache or sore throat.  Cardiovascular: No chest pain or palpitations.  No orthopnea or PND.  No ankle swelling  Pulmonary: No cough or shortness of breath.  Gastrointestinal: Patient has had abdominal pain.  Upper mid abdomen and to the left upper quadrant.  See HPI.  No nausea or vomiting.  No indigestion or heartburn.  No diarrhea or constipation.  Patient had 2 normal bowel movements this morning.  It seemed to have no effect on his abdominal pain.  : No burning with urination.  Musculoskeletal: No significant joint pains.  Dermatology: No rash.  Psychology: Not depressed.      /77 (BP Location: Left arm, Patient Position: Sitting)   Pulse 84   Temp 97.9 °F (36.6 °C) (Oral)   Resp 16   Ht 180.3 cm (71\")   Wt 97.5 kg (214 lb 15.2 oz) Comment: previous weight  SpO2 100%   BMI 29.98 kg/m²     Physical Exam  General: Very pleasant elderly white male who was in moderate distress this morning the office.  This is improved at this time.  Head: Male pattern baldness.  Sun damaged skin on the scalp.  Neck: JVD physiologic.  No lymphadenopathy.  Heart: Regular rate, irregularly irregular rhythm.  Lungs: Clear to auscultation.  Abdomen: Protuberant, soft, bowel sounds are present.  Mild epigastric left and right upper quadrant tenderness to palpation.  No rebound or guarding.  Back: No percussion tenderness of the spine.  GYN: No rash.  Numerous scattered seborrheic keratoses over the back.  Extremities: No " edema.  Osteoarthritic changes.  Neurologic exam: Nonfocal  Lab Results (last 24 hours)       ** No results found for the last 24 hours. **            Imp:  Probable acute cholecystitis; I have discontinued Eliquis.  He did get a dose this morning.  Will initiate Zosyn.  Will give him a clear liquid diet today, but n.p.o., after midnight.  Will obtain an ultrasound of the gallbladder in the morning and ask general surgery to see patient tomorrow.  If general surgery feels that patient does have cholecystitis he could possibly have surgery tomorrow afternoon or Friday morning.          Plan:  Complete admission database (blood work, EKG, chest x-ray, and ultrasound gallbladder)  SCDs for DVT prophylaxis  Hold Eliquis  Begin Zosyn  Pain and nausea medications as needed.  Maintenance IV fluids  General surgery consultation    Drew Landry Jr., MD  5/15/2024  20:55 EDT

## 2024-05-16 NOTE — CONSULTS
General Surgery H&P/Consultation      Impression/Plan: 78-year-old gentleman with signs and symptoms most consistent with acute cholecystitis.  His pain is improving and based on his transaminases and bilirubin trend I suspect he could have passed a stone.  I have recommended proceeding with laparoscopic cholecystectomy with intraoperative cholangiogram.  I recommend doing this on 5/17/2024 to allow 48 hours from last dose of Eliquis.  Risk and rationale for the surgery were discussed with him and he is in agreement with proceeding.  He is okay for a clear liquid diet today.  I will make him n.p.o. after midnight.    CC: Abdominal pain    HPI:   Mr. Duy Valdez is a 78 y.o. male that presented to the hospital with acute onset of upper abdominal pain at 7 AM yesterday.  It started in the epigastrium with some radiation to the left upper quadrant.  It was initially severe in intensity and then started to ease up.  He underwent a CT scan as an outpatient concerning for cholecystitis.  He was admitted to the hospital by Dr. Landry for further management.  Currently his pain is much improved.  Denies any nausea or emesis.  He takes Eliquis for history of atrial fibrillation.  His last dose was yesterday morning.    Past Medical History:   Past Medical History:   Diagnosis Date    Atrial fibrillation        Past Surgical History:   Past Surgical History:   Procedure Laterality Date    CARDIAC ABLATION      CYSTOSCOPY BLADDER STONE LITHOTRIPSY         Medications:  Medications Prior to Admission   Medication Sig Dispense Refill Last Dose    acetaminophen (TYLENOL) 500 MG tablet 2 tablets.   Past Week    amLODIPine (NORVASC) 2.5 MG tablet Take 1 tablet by mouth Daily.   5/15/2024 at 0700    atorvastatin (LIPITOR) 10 MG tablet Take 8 tablets by mouth Daily.   5/15/2024 at 0700    azelastine (ASTELIN) 0.1 % nasal spray azelastine 137 mcg (0.1 %) nasal spray aerosol   USE 1 SPRAY IN EACH NOSTRIL TWICE DAILY   5/14/2024     Cholecalciferol 125 MCG (5000 UT) tablet cholecalciferol (vitamin D3) 125 mcg (5,000 unit) tablet   TAKE 1 TABLET BY MOUTH ONCE DAILY   5/15/2024 at 0700    cyanocobalamin (VITAMIN B-12) 250 MCG tablet B12   5/15/2024 at 0700    Eliquis 5 MG tablet tablet Take 1 tablet by mouth 2 (Two) Times a Day.   5/15/2024 at 0700    Fluticasone Furoate (Arnuity Ellipta) 100 MCG/ACT aerosol powder  Inhale 1 puff Daily.   5/15/2024 at 0700    metoprolol succinate XL (TOPROL-XL) 25 MG 24 hr tablet 0.5 tablets Daily.   5/14/2024    amiodarone (PACERONE) 200 MG tablet amiodarone 200 mg tablet   TAKE 1 TABLET BY MOUTH ONCE DAILY            Current Facility-Administered Medications:     acetaminophen (TYLENOL) tablet 650 mg, 650 mg, Oral, Q4H PRN, Drew Landry Jr., MD    albuterol (PROVENTIL) nebulizer solution 0.083% 2.5 mg/3mL, 2.5 mg, Nebulization, Q6H PRN, Drew Landry Jr., MD    atorvastatin (LIPITOR) tablet 80 mg, 80 mg, Oral, Daily, Drew Landry Jr., MD    Calcium Replacement - Follow Nurse / BPA Driven Protocol, , Does not apply, PRN, Drew Landry Jr., MD    HYDROmorphone (DILAUDID) injection 0.5 mg, 0.5 mg, Intravenous, Q2H PRN **AND** naloxone (NARCAN) injection 0.4 mg, 0.4 mg, Intravenous, Q5 Min PRN, Drew Landry Jr., MD    Magnesium Standard Dose Replacement - Follow Nurse / BPA Driven Protocol, , Does not apply, PRNGris Felix Albert Jr., MD    metoprolol succinate XL (TOPROL-XL) 24 hr tablet 25 mg, 25 mg, Oral, Q24H, Drew Landry Jr., MD, 12.5 mg at 05/15/24 2314    ondansetron (ZOFRAN) injection 4 mg, 4 mg, Intravenous, Q6H PRN, Drew Landry Jr., MD    Pharmacy to Dose Zosyn, , Does not apply, Continuous PRN, Drew Landry Jr., MD    Phosphorus Replacement - Follow Nurse / BPA Driven Protocol, , Does not apply, PRNGris Felix Albert Jr., MD    piperacillin-tazobactam (ZOSYN) 3.375 g IVPB in 100 mL NS MBP (CD), 3.375 g, Intravenous, Q8H, Drew Landry  MD Lencho    Potassium Replacement - Follow Nurse / BPA Driven Protocol, , Does not apply, Gris RICH Felix Albert Jr., MD    sodium chloride 0.9 % flush 10 mL, 10 mL, Intravenous, Q12H, Drew Landry Jr., MD, 10 mL at 05/15/24 2315    sodium chloride 0.9 % flush 10 mL, 10 mL, Intravenous, PRNGris Felix Albert Jr., MD    sodium chloride 0.9 % infusion 40 mL, 40 mL, Intravenous, PRNGris Felix Albert Jr., MD     Allergies: No Known Allergies    Social History:   Social History     Socioeconomic History    Marital status:    Tobacco Use    Smoking status: Former     Types: Cigarettes   Vaping Use    Vaping status: Never Used   Substance and Sexual Activity    Alcohol use: Never    Drug use: Never       Family History:   History reviewed. No pertinent family history.    Review of Systems:  Clinically relevant review of systems completed and documented in HPI    Physical Exam:   Vitals:    05/16/24 0528   BP: 121/69   Pulse: 57   Resp: 16   Temp: 98.1 °F (36.7 °C)   SpO2: 98%     BMI: Body mass index is 29.98 kg/m².   97.5 kg (214 lb 15.2 oz)      Intake/Output Summary (Last 24 hours) at 5/16/2024 0756  Last data filed at 5/16/2024 0655  Gross per 24 hour   Intake 816.25 ml   Output 250 ml   Net 566.25 ml       GENERAL: no acute distress, awake and alert  RESPIRATORY: symmetric excursion bilaterally, normal work of breathing  CARDIOVASCULAR: Regular rate, well perfused  GASTROINTESTINAL: Soft, minimal tenderness in the upper abdomen, nondistended      Pertinent labs:   Results from last 7 days   Lab Units 05/15/24  2219   WBC 10*3/mm3 8.28   HEMOGLOBIN g/dL 14.9   HEMATOCRIT % 43.6   PLATELETS 10*3/mm3 121*     Results from last 7 days   Lab Units 05/15/24  2219 05/15/24  1710   SODIUM mmol/L 136  136  --    POTASSIUM mmol/L 4.2  4.2  --    CHLORIDE mmol/L 104  104  --    CO2 mmol/L 23.5  23.5  --    BUN mg/dL 13  13  --    CREATININE mg/dL 0.83  0.83 0.70   CALCIUM mg/dL 9.1  9.1  --     BILIRUBIN mg/dL 2.0*  --    ALK PHOS U/L 147*  --    ALT (SGPT) U/L 205*  --    AST (SGOT) U/L 176*  --    GLUCOSE mg/dL 82  82  --        IMAGING:  CT abdomen pelvis reviewed.  I compared this to the CT chest from 10/20/2023.  There is edema around the wall of the gallbladder which is new compared to the previous scan.  Remainder of findings and the radiology report reviewed and no acute abnormalities otherwise.              Fabio Foster MD  General and Endoscopic Surgery  [facility]    [address], [address]  [place] [address]  P: [phone]  F: [phone]

## 2024-05-17 ENCOUNTER — ANESTHESIA (OUTPATIENT)
Dept: PERIOP | Facility: HOSPITAL | Age: 79
End: 2024-05-17
Payer: MEDICARE

## 2024-05-17 ENCOUNTER — APPOINTMENT (OUTPATIENT)
Dept: GENERAL RADIOLOGY | Facility: HOSPITAL | Age: 79
End: 2024-05-17
Payer: MEDICARE

## 2024-05-17 ENCOUNTER — ANESTHESIA EVENT (OUTPATIENT)
Dept: PERIOP | Facility: HOSPITAL | Age: 79
End: 2024-05-17
Payer: MEDICARE

## 2024-05-17 LAB
ALBUMIN SERPL-MCNC: 4 G/DL (ref 3.5–5.2)
ALBUMIN/GLOB SERPL: 2 G/DL
ALP SERPL-CCNC: 126 U/L (ref 39–117)
ALT SERPL W P-5'-P-CCNC: 100 U/L (ref 1–41)
ANION GAP SERPL CALCULATED.3IONS-SCNC: 7 MMOL/L (ref 5–15)
AST SERPL-CCNC: 49 U/L (ref 1–40)
BASOPHILS # BLD AUTO: 0.04 10*3/MM3 (ref 0–0.2)
BASOPHILS NFR BLD AUTO: 0.4 % (ref 0–1.5)
BILIRUB SERPL-MCNC: 1.5 MG/DL (ref 0–1.2)
BUN SERPL-MCNC: 10 MG/DL (ref 8–23)
BUN/CREAT SERPL: 12 (ref 7–25)
CALCIUM SPEC-SCNC: 8.5 MG/DL (ref 8.6–10.5)
CHLORIDE SERPL-SCNC: 105 MMOL/L (ref 98–107)
CO2 SERPL-SCNC: 27 MMOL/L (ref 22–29)
CREAT SERPL-MCNC: 0.83 MG/DL (ref 0.76–1.27)
DEPRECATED RDW RBC AUTO: 42.8 FL (ref 37–54)
EGFRCR SERPLBLD CKD-EPI 2021: 90.1 ML/MIN/1.73
EOSINOPHIL # BLD AUTO: 0.01 10*3/MM3 (ref 0–0.4)
EOSINOPHIL NFR BLD AUTO: 0.1 % (ref 0.3–6.2)
ERYTHROCYTE [DISTWIDTH] IN BLOOD BY AUTOMATED COUNT: 12.6 % (ref 12.3–15.4)
GLOBULIN UR ELPH-MCNC: 2 GM/DL
GLUCOSE SERPL-MCNC: 100 MG/DL (ref 65–99)
HCT VFR BLD AUTO: 44.3 % (ref 37.5–51)
HGB BLD-MCNC: 14.7 G/DL (ref 13–17.7)
IMM GRANULOCYTES # BLD AUTO: 0.04 10*3/MM3 (ref 0–0.05)
IMM GRANULOCYTES NFR BLD AUTO: 0.4 % (ref 0–0.5)
LYMPHOCYTES # BLD AUTO: 0.63 10*3/MM3 (ref 0.7–3.1)
LYMPHOCYTES NFR BLD AUTO: 6.9 % (ref 19.6–45.3)
MCH RBC QN AUTO: 31.4 PG (ref 26.6–33)
MCHC RBC AUTO-ENTMCNC: 33.2 G/DL (ref 31.5–35.7)
MCV RBC AUTO: 94.7 FL (ref 79–97)
MONOCYTES # BLD AUTO: 0.25 10*3/MM3 (ref 0.1–0.9)
MONOCYTES NFR BLD AUTO: 2.7 % (ref 5–12)
NEUTROPHILS NFR BLD AUTO: 8.16 10*3/MM3 (ref 1.7–7)
NEUTROPHILS NFR BLD AUTO: 89.5 % (ref 42.7–76)
PLATELET # BLD AUTO: 104 10*3/MM3 (ref 140–450)
PMV BLD AUTO: 10.7 FL (ref 6–12)
POTASSIUM SERPL-SCNC: 4.2 MMOL/L (ref 3.5–5.2)
PROT SERPL-MCNC: 6 G/DL (ref 6–8.5)
RBC # BLD AUTO: 4.68 10*6/MM3 (ref 4.14–5.8)
SODIUM SERPL-SCNC: 139 MMOL/L (ref 136–145)
WBC NRBC COR # BLD AUTO: 9.13 10*3/MM3 (ref 3.4–10.8)

## 2024-05-17 PROCEDURE — 47563 LAPARO CHOLECYSTECTOMY/GRAPH: CPT | Performed by: SURGERY

## 2024-05-17 PROCEDURE — 25010000002 DEXAMETHASONE SODIUM PHOSPHATE 20 MG/5ML SOLUTION

## 2024-05-17 PROCEDURE — 0 IOTHALAMATE 60 % SOLUTION: Performed by: SURGERY

## 2024-05-17 PROCEDURE — 88304 TISSUE EXAM BY PATHOLOGIST: CPT | Performed by: SURGERY

## 2024-05-17 PROCEDURE — 25010000002 PROPOFOL 200 MG/20ML EMULSION

## 2024-05-17 PROCEDURE — 25010000002 SUGAMMADEX 200 MG/2ML SOLUTION

## 2024-05-17 PROCEDURE — 25010000002 HYDROMORPHONE PER 4 MG

## 2024-05-17 PROCEDURE — 25810000003 SODIUM CHLORIDE PER 500 ML: Performed by: SURGERY

## 2024-05-17 PROCEDURE — 25010000002 PIPERACILLIN SOD-TAZOBACTAM PER 1 G: Performed by: INTERNAL MEDICINE

## 2024-05-17 PROCEDURE — 25010000002 GLYCOPYRROLATE 0.2 MG/ML SOLUTION

## 2024-05-17 PROCEDURE — 25810000003 LACTATED RINGERS PER 1000 ML: Performed by: ANESTHESIOLOGY

## 2024-05-17 PROCEDURE — 74300 X-RAY BILE DUCTS/PANCREAS: CPT

## 2024-05-17 PROCEDURE — 85025 COMPLETE CBC W/AUTO DIFF WBC: CPT | Performed by: INTERNAL MEDICINE

## 2024-05-17 PROCEDURE — 25010000002 FENTANYL CITRATE (PF) 50 MCG/ML SOLUTION: Performed by: ANESTHESIOLOGY

## 2024-05-17 PROCEDURE — 25010000002 CEFOXITIN PER 1 G: Performed by: SURGERY

## 2024-05-17 PROCEDURE — 0FT44ZZ RESECTION OF GALLBLADDER, PERCUTANEOUS ENDOSCOPIC APPROACH: ICD-10-PCS | Performed by: SURGERY

## 2024-05-17 PROCEDURE — 80053 COMPREHEN METABOLIC PANEL: CPT | Performed by: SURGERY

## 2024-05-17 PROCEDURE — BF101ZZ FLUOROSCOPY OF BILE DUCTS USING LOW OSMOLAR CONTRAST: ICD-10-PCS | Performed by: SURGERY

## 2024-05-17 PROCEDURE — 25010000002 ONDANSETRON PER 1 MG: Performed by: INTERNAL MEDICINE

## 2024-05-17 DEVICE — HORIZON TI ML 6 CLIPS/CART
Type: IMPLANTABLE DEVICE | Site: ABDOMEN | Status: FUNCTIONAL
Brand: WECK

## 2024-05-17 RX ORDER — GLYCOPYRROLATE 0.2 MG/ML
INJECTION INTRAMUSCULAR; INTRAVENOUS AS NEEDED
Status: DISCONTINUED | OUTPATIENT
Start: 2024-05-17 | End: 2024-05-17 | Stop reason: SURG

## 2024-05-17 RX ORDER — FLUMAZENIL 0.1 MG/ML
0.2 INJECTION INTRAVENOUS AS NEEDED
Status: DISCONTINUED | OUTPATIENT
Start: 2024-05-17 | End: 2024-05-17 | Stop reason: HOSPADM

## 2024-05-17 RX ORDER — PROMETHAZINE HYDROCHLORIDE 25 MG/1
25 TABLET ORAL ONCE AS NEEDED
Status: DISCONTINUED | OUTPATIENT
Start: 2024-05-17 | End: 2024-05-17 | Stop reason: HOSPADM

## 2024-05-17 RX ORDER — SODIUM CHLORIDE 0.9 % (FLUSH) 0.9 %
3 SYRINGE (ML) INJECTION EVERY 12 HOURS SCHEDULED
Status: DISCONTINUED | OUTPATIENT
Start: 2024-05-17 | End: 2024-05-17 | Stop reason: HOSPADM

## 2024-05-17 RX ORDER — HYDROCODONE BITARTRATE AND ACETAMINOPHEN 5; 325 MG/1; MG/1
1 TABLET ORAL ONCE AS NEEDED
Status: DISCONTINUED | OUTPATIENT
Start: 2024-05-17 | End: 2024-05-17 | Stop reason: HOSPADM

## 2024-05-17 RX ORDER — SODIUM CHLORIDE, SODIUM LACTATE, POTASSIUM CHLORIDE, CALCIUM CHLORIDE 600; 310; 30; 20 MG/100ML; MG/100ML; MG/100ML; MG/100ML
9 INJECTION, SOLUTION INTRAVENOUS CONTINUOUS
Status: DISCONTINUED | OUTPATIENT
Start: 2024-05-17 | End: 2024-05-17

## 2024-05-17 RX ORDER — SODIUM CHLORIDE 9 MG/ML
INJECTION, SOLUTION INTRAVENOUS AS NEEDED
Status: DISCONTINUED | OUTPATIENT
Start: 2024-05-17 | End: 2024-05-17 | Stop reason: HOSPADM

## 2024-05-17 RX ORDER — DIPHENHYDRAMINE HYDROCHLORIDE 50 MG/ML
12.5 INJECTION INTRAMUSCULAR; INTRAVENOUS
Status: DISCONTINUED | OUTPATIENT
Start: 2024-05-17 | End: 2024-05-17 | Stop reason: HOSPADM

## 2024-05-17 RX ORDER — ROCURONIUM BROMIDE 10 MG/ML
INJECTION, SOLUTION INTRAVENOUS AS NEEDED
Status: DISCONTINUED | OUTPATIENT
Start: 2024-05-17 | End: 2024-05-17 | Stop reason: SURG

## 2024-05-17 RX ORDER — EPHEDRINE SULFATE 50 MG/ML
5 INJECTION, SOLUTION INTRAVENOUS ONCE AS NEEDED
Status: DISCONTINUED | OUTPATIENT
Start: 2024-05-17 | End: 2024-05-17 | Stop reason: HOSPADM

## 2024-05-17 RX ORDER — NALOXONE HCL 0.4 MG/ML
0.2 VIAL (ML) INJECTION AS NEEDED
Status: DISCONTINUED | OUTPATIENT
Start: 2024-05-17 | End: 2024-05-17 | Stop reason: HOSPADM

## 2024-05-17 RX ORDER — LIDOCAINE HYDROCHLORIDE 20 MG/ML
INJECTION, SOLUTION INFILTRATION; PERINEURAL AS NEEDED
Status: DISCONTINUED | OUTPATIENT
Start: 2024-05-17 | End: 2024-05-17 | Stop reason: SURG

## 2024-05-17 RX ORDER — MAGNESIUM HYDROXIDE 1200 MG/15ML
LIQUID ORAL AS NEEDED
Status: DISCONTINUED | OUTPATIENT
Start: 2024-05-17 | End: 2024-05-17 | Stop reason: HOSPADM

## 2024-05-17 RX ORDER — PHENYLEPHRINE HCL IN 0.9% NACL 1 MG/10 ML
SYRINGE (ML) INTRAVENOUS AS NEEDED
Status: DISCONTINUED | OUTPATIENT
Start: 2024-05-17 | End: 2024-05-17 | Stop reason: SURG

## 2024-05-17 RX ORDER — HYDROMORPHONE HYDROCHLORIDE 1 MG/ML
0.5 INJECTION, SOLUTION INTRAMUSCULAR; INTRAVENOUS; SUBCUTANEOUS
Status: DISCONTINUED | OUTPATIENT
Start: 2024-05-17 | End: 2024-05-17 | Stop reason: HOSPADM

## 2024-05-17 RX ORDER — LIDOCAINE HYDROCHLORIDE 10 MG/ML
0.5 INJECTION, SOLUTION INFILTRATION; PERINEURAL ONCE AS NEEDED
Status: DISCONTINUED | OUTPATIENT
Start: 2024-05-17 | End: 2024-05-17 | Stop reason: HOSPADM

## 2024-05-17 RX ORDER — TRAMADOL HYDROCHLORIDE 50 MG/1
50 TABLET ORAL EVERY 6 HOURS PRN
Status: DISCONTINUED | OUTPATIENT
Start: 2024-05-17 | End: 2024-05-18 | Stop reason: HOSPADM

## 2024-05-17 RX ORDER — DROPERIDOL 2.5 MG/ML
0.62 INJECTION, SOLUTION INTRAMUSCULAR; INTRAVENOUS
Status: DISCONTINUED | OUTPATIENT
Start: 2024-05-17 | End: 2024-05-17 | Stop reason: HOSPADM

## 2024-05-17 RX ORDER — ONDANSETRON 2 MG/ML
4 INJECTION INTRAMUSCULAR; INTRAVENOUS ONCE AS NEEDED
Status: DISCONTINUED | OUTPATIENT
Start: 2024-05-17 | End: 2024-05-17 | Stop reason: HOSPADM

## 2024-05-17 RX ORDER — MIDAZOLAM HYDROCHLORIDE 1 MG/ML
0.5 INJECTION INTRAMUSCULAR; INTRAVENOUS
Status: DISCONTINUED | OUTPATIENT
Start: 2024-05-17 | End: 2024-05-17 | Stop reason: HOSPADM

## 2024-05-17 RX ORDER — PROPOFOL 10 MG/ML
INJECTION, EMULSION INTRAVENOUS AS NEEDED
Status: DISCONTINUED | OUTPATIENT
Start: 2024-05-17 | End: 2024-05-17 | Stop reason: SURG

## 2024-05-17 RX ORDER — SODIUM CHLORIDE 0.9 % (FLUSH) 0.9 %
3-10 SYRINGE (ML) INJECTION AS NEEDED
Status: DISCONTINUED | OUTPATIENT
Start: 2024-05-17 | End: 2024-05-17 | Stop reason: HOSPADM

## 2024-05-17 RX ORDER — DEXAMETHASONE SODIUM PHOSPHATE 4 MG/ML
INJECTION, SOLUTION INTRA-ARTICULAR; INTRALESIONAL; INTRAMUSCULAR; INTRAVENOUS; SOFT TISSUE AS NEEDED
Status: DISCONTINUED | OUTPATIENT
Start: 2024-05-17 | End: 2024-05-17 | Stop reason: SURG

## 2024-05-17 RX ORDER — IPRATROPIUM BROMIDE AND ALBUTEROL SULFATE 2.5; .5 MG/3ML; MG/3ML
3 SOLUTION RESPIRATORY (INHALATION) ONCE AS NEEDED
Status: DISCONTINUED | OUTPATIENT
Start: 2024-05-17 | End: 2024-05-17 | Stop reason: HOSPADM

## 2024-05-17 RX ORDER — FENTANYL CITRATE 50 UG/ML
50 INJECTION, SOLUTION INTRAMUSCULAR; INTRAVENOUS
Status: DISCONTINUED | OUTPATIENT
Start: 2024-05-17 | End: 2024-05-17 | Stop reason: HOSPADM

## 2024-05-17 RX ORDER — BUPIVACAINE HYDROCHLORIDE AND EPINEPHRINE 5; 5 MG/ML; UG/ML
INJECTION, SOLUTION EPIDURAL; INTRACAUDAL; PERINEURAL AS NEEDED
Status: DISCONTINUED | OUTPATIENT
Start: 2024-05-17 | End: 2024-05-17 | Stop reason: HOSPADM

## 2024-05-17 RX ORDER — PROMETHAZINE HYDROCHLORIDE 25 MG/1
25 SUPPOSITORY RECTAL ONCE AS NEEDED
Status: DISCONTINUED | OUTPATIENT
Start: 2024-05-17 | End: 2024-05-17 | Stop reason: HOSPADM

## 2024-05-17 RX ORDER — FAMOTIDINE 10 MG/ML
20 INJECTION, SOLUTION INTRAVENOUS ONCE
Status: COMPLETED | OUTPATIENT
Start: 2024-05-17 | End: 2024-05-17

## 2024-05-17 RX ORDER — OXYCODONE AND ACETAMINOPHEN 7.5; 325 MG/1; MG/1
1 TABLET ORAL EVERY 4 HOURS PRN
Status: DISCONTINUED | OUTPATIENT
Start: 2024-05-17 | End: 2024-05-17 | Stop reason: HOSPADM

## 2024-05-17 RX ORDER — HYDRALAZINE HYDROCHLORIDE 20 MG/ML
5 INJECTION INTRAMUSCULAR; INTRAVENOUS
Status: DISCONTINUED | OUTPATIENT
Start: 2024-05-17 | End: 2024-05-17 | Stop reason: HOSPADM

## 2024-05-17 RX ORDER — LABETALOL HYDROCHLORIDE 5 MG/ML
5 INJECTION, SOLUTION INTRAVENOUS
Status: DISCONTINUED | OUTPATIENT
Start: 2024-05-17 | End: 2024-05-17 | Stop reason: HOSPADM

## 2024-05-17 RX ORDER — FENTANYL CITRATE 50 UG/ML
50 INJECTION, SOLUTION INTRAMUSCULAR; INTRAVENOUS ONCE AS NEEDED
Status: COMPLETED | OUTPATIENT
Start: 2024-05-17 | End: 2024-05-17

## 2024-05-17 RX ADMIN — FENTANYL CITRATE 50 MCG: 50 INJECTION, SOLUTION INTRAMUSCULAR; INTRAVENOUS at 07:33

## 2024-05-17 RX ADMIN — PIPERACILLIN AND TAZOBACTAM 3.38 G: 3; .375 INJECTION, POWDER, FOR SOLUTION INTRAVENOUS at 01:55

## 2024-05-17 RX ADMIN — ROCURONIUM BROMIDE 50 MG: 50 INJECTION INTRAVENOUS at 07:33

## 2024-05-17 RX ADMIN — LIDOCAINE HYDROCHLORIDE 100 MG: 20 INJECTION, SOLUTION INFILTRATION; PERINEURAL at 07:33

## 2024-05-17 RX ADMIN — Medication 10 ML: at 21:07

## 2024-05-17 RX ADMIN — ONDANSETRON 4 MG: 2 INJECTION INTRAMUSCULAR; INTRAVENOUS at 08:47

## 2024-05-17 RX ADMIN — SUGAMMADEX 200 MG: 100 INJECTION, SOLUTION INTRAVENOUS at 08:49

## 2024-05-17 RX ADMIN — GLYCOPYRROLATE 0.3 MG: 0.2 INJECTION INTRAMUSCULAR; INTRAVENOUS at 07:55

## 2024-05-17 RX ADMIN — Medication 100 MCG: at 08:31

## 2024-05-17 RX ADMIN — DEXAMETHASONE SODIUM PHOSPHATE 8 MG: 4 INJECTION, SOLUTION INTRAMUSCULAR; INTRAVENOUS at 08:01

## 2024-05-17 RX ADMIN — PROPOFOL 200 MG: 10 INJECTION, EMULSION INTRAVENOUS at 07:33

## 2024-05-17 RX ADMIN — TRAMADOL HYDROCHLORIDE 50 MG: 50 TABLET ORAL at 22:12

## 2024-05-17 RX ADMIN — FAMOTIDINE 20 MG: 10 INJECTION INTRAVENOUS at 06:52

## 2024-05-17 RX ADMIN — HYDROMORPHONE HYDROCHLORIDE 0.5 MG: 1 INJECTION, SOLUTION INTRAMUSCULAR; INTRAVENOUS; SUBCUTANEOUS at 09:22

## 2024-05-17 RX ADMIN — SODIUM CHLORIDE, POTASSIUM CHLORIDE, SODIUM LACTATE AND CALCIUM CHLORIDE 9 ML/HR: 600; 310; 30; 20 INJECTION, SOLUTION INTRAVENOUS at 06:52

## 2024-05-17 RX ADMIN — CEFOXITIN SODIUM 2000 MG: 2 POWDER, FOR SOLUTION INTRAVENOUS at 07:22

## 2024-05-17 RX ADMIN — METOPROLOL SUCCINATE 12.5 MG: 25 TABLET, EXTENDED RELEASE ORAL at 05:11

## 2024-05-17 RX ADMIN — ACETAMINOPHEN 650 MG: 325 TABLET, FILM COATED ORAL at 22:11

## 2024-05-17 NOTE — PLAN OF CARE
Goal Outcome Evaluation:  Plan of Care Reviewed With: patient, spouse        Progress: no change   Patient NPO since midnight for am procedure. No complaints of pain. Continues with IV Zosyn.

## 2024-05-17 NOTE — PROGRESS NOTES
History:  Patient is awake and alert and sitting up in bed.  He has about postoperative soreness, but overall feels very good.  He has not had any solid food yet.  He looks forward to eating some mashed potatoes.  He is going to get up and walk.  He would like to stay in the hospital until tomorrow morning to make sure he is doing okay before going home.    Allergies  Patient has no known allergies.      Current Facility-Administered Medications:     acetaminophen (TYLENOL) tablet 650 mg, 650 mg, Oral, Q4H PRN, Fabio Foster MD    albuterol (PROVENTIL) nebulizer solution 0.083% 2.5 mg/3mL, 2.5 mg, Nebulization, Q6H PRN, Fabio Foster MD    atorvastatin (LIPITOR) tablet 80 mg, 80 mg, Oral, Daily, Fabio Foster MD, 80 mg at 05/16/24 0837    Calcium Replacement - Follow Nurse / BPA Driven Protocol, , Does not apply, PRNKristin Brent A, MD    HYDROmorphone (DILAUDID) injection 0.5 mg, 0.5 mg, Intravenous, Q2H PRN **AND** naloxone (NARCAN) injection 0.4 mg, 0.4 mg, Intravenous, Q5 Min PRN, Fabio Foster MD    Magnesium Standard Dose Replacement - Follow Nurse / BPA Driven Protocol, , Does not apply, PRKristin JACOBO Brent A, MD    metoprolol succinate XL (TOPROL-XL) 24 hr tablet 25 mg, 25 mg, Oral, Q24H, Fabio Foster MD, 12.5 mg at 05/17/24 0511    ondansetron (ZOFRAN) injection 4 mg, 4 mg, Intravenous, Q6H PRN, Fabio Foster MD, 4 mg at 05/17/24 0847    Phosphorus Replacement - Follow Nurse / BPA Driven Protocol, , Does not apply, PRKristin JACOBO Brent A, MD    Potassium Replacement - Follow Nurse / BPA Driven Protocol, , Does not apply, PRNKristin Brent A, MD    sodium chloride 0.9 % flush 10 mL, 10 mL, Intravenous, Q12H, Fabio Foster MD, 10 mL at 05/16/24 2138    sodium chloride 0.9 % flush 10 mL, 10 mL, Intravenous, PRN, Fabio Foster MD    sodium chloride 0.9 % infusion 40 mL, 40 mL, Intravenous, PRN, Fabio Foster MD    traMADol (ULTRAM) tablet 50 mg, 50 mg, Oral, Q6H PRN,  "Fabio Foster MD    /72 (BP Location: Left arm, Patient Position: Lying)   Pulse 80   Temp 97.5 °F (36.4 °C) (Oral)   Resp 16   Ht 180.3 cm (71\")   Wt 101 kg (222 lb 14.2 oz)   SpO2 95%   BMI 31.09 kg/m²     Physical Exam  General: Very pleasant elderly white male in NAD  Neck: JVD physiologic.  No lymphadenopathy.  Heart: Regular rate, irregularly irregular rhythm.  Lungs: Clear to auscultation.  Abdomen: Protuberant, soft, bowel sounds are present.  Incisions look good.  No sign of infection.  Just mild incisional tenderness.  Back: No percussion tenderness of the spine.  Skin: No rash.  Numerous scattered seborrheic keratoses over the back.  Extremities: No edema.  Osteoarthritic changes.  Neurologic exam: Nonfocal  Lab Results (last 24 hours)       Procedure Component Value Units Date/Time    CBC & Differential [[id number]]  (Abnormal) Collected: 05/17/24 1116    Specimen: Blood Updated: 05/17/24 1155    Narrative:      The following orders were created for panel order CBC & Differential.  Procedure                               Abnormality         Status                     ---------                               -----------         ------                     CBC Auto Differential[[id number]]        Abnormal            Final result                 Please view results for these tests on the individual orders.    CBC Auto Differential [[id number]]  (Abnormal) Collected: 05/17/24 1116    Specimen: Blood Updated: 05/17/24 1155     WBC 9.13 10*3/mm3      RBC 4.68 10*6/mm3      Hemoglobin 14.7 g/dL      Hematocrit 44.3 %      MCV 94.7 fL      MCH 31.4 pg      MCHC 33.2 g/dL      RDW 12.6 %      RDW-SD 42.8 fl      MPV 10.7 fL      Platelets 104 10*3/mm3      Neutrophil % 89.5 %      Lymphocyte % 6.9 %      Monocyte % 2.7 %      Eosinophil % 0.1 %      Basophil % 0.4 %      Immature Grans % 0.4 %      Neutrophils, Absolute 8.16 10*3/mm3      Lymphocytes, Absolute 0.63 10*3/mm3      Monocytes, Absolute " 0.25 10*3/mm3      Eosinophils, Absolute 0.01 10*3/mm3      Basophils, Absolute 0.04 10*3/mm3      Immature Grans, Absolute 0.04 10*3/mm3     Tissue Pathology Exam [[id number]] Collected: 05/17/24 0816    Specimen: Tissue from Gallbladder Updated: 05/17/24 0943    Comprehensive Metabolic Panel [[id number]]  (Abnormal) Collected: 05/17/24 0658    Specimen: Blood from Arm, Right Updated: 05/17/24 0725     Glucose 100 mg/dL      BUN 10 mg/dL      Creatinine 0.83 mg/dL      Sodium 139 mmol/L      Potassium 4.2 mmol/L      Chloride 105 mmol/L      CO2 27.0 mmol/L      Calcium 8.5 mg/dL      Total Protein 6.0 g/dL      Albumin 4.0 g/dL      ALT (SGPT) 100 U/L      AST (SGOT) 49 U/L      Alkaline Phosphatase 126 U/L      Total Bilirubin 1.5 mg/dL      Globulin 2.0 gm/dL      A/G Ratio 2.0 g/dL      BUN/Creatinine Ratio 12.0     Anion Gap 7.0 mmol/L      eGFR 90.1 mL/min/1.73     Narrative:      GFR Normal >60  Chronic Kidney Disease <60  Kidney Failure <15    The GFR formula is only valid for adults with stable renal function between ages 18 and 70.    Blood Culture - Blood, Arm, Right [[id number]]  (Normal) Collected: 05/15/24 2221    Specimen: Blood from Arm, Right Updated: 05/16/24 2232     Blood Culture No growth at 24 hours    Blood Culture - Blood, Arm, Left [[id number]]  (Normal) Collected: 05/15/24 2219    Specimen: Blood from Arm, Left Updated: 05/16/24 2232     Blood Culture No growth at 24 hours            Imp:  Day of surgery laparoscopic cholecystectomy; appreciate Dr. Foster's expertise.  Patient looks great postoperatively.  Patient is off the Zosyn.  He can restart Eliquis tomorrow.      Hypertension; controlled.      Plan:  Advance diet.  Encourage patient to ambulate.  Restart Eliquis tomorrow  Hopefully discharge first thing in the morning tomorrow.        Drew Landry Jr., MD  5/17/2024  12:12 EDT

## 2024-05-17 NOTE — CASE MANAGEMENT/SOCIAL WORK
Discharge Planning Assessment  [facility]     Patient Name: Duy Valdez  MRN: [id number]  Today's Date: 5/17/2024    Admit Date: 5/15/2024    Plan: Home with family to transport.   Discharge Needs Assessment       Row Name 05/17/24 1706       Living Environment    People in Home spouse    Current Living Arrangements home    Potentially Unsafe Housing Conditions none    In the past 12 months has the electric, gas, oil, or water company threatened to shut off services in your home? No    Primary Care Provided by self    Provides Primary Care For no one    Family Caregiver if Needed none    Able to Return to Prior Arrangements yes       Resource/Environmental Concerns    Resource/Environmental Concerns none    Transportation Concerns none       Transportation Needs    In the past 12 months, has lack of transportation kept you from medical appointments or from getting medications? no    In the past 12 months, has lack of transportation kept you from meetings, work, or from getting things needed for daily living? No       Food Insecurity    Within the past 12 months, you worried that your food would run out before you got the money to buy more. Never true    Within the past 12 months, the food you bought just didn't last and you didn't have money to get more. Never true       Transition Planning    Patient/Family Anticipates Transition to home with family    Patient/Family Anticipated Services at Transition none    Transportation Anticipated family or friend will provide       Discharge Needs Assessment    Readmission Within the Last 30 Days no previous admission in last 30 days    Equipment Currently Used at Home none    Concerns to be Addressed denies needs/concerns at this time;discharge planning                   Discharge Plan       Row Name 05/17/24 1705       Plan    Plan Home with family to transport.    Plan Comments Spoke with patient at bedside. Introduced self and explained CCP role. Verified face  Vaccine Information Statement(s) was given today. This has been reviewed, questions answered, and verbal consent given by Patient for injection(s) and administration of Hepatitis A.        Patient tolerated without incident. See immunization grid for documentation.     sheet and local pharmacy is [facility], patient enrolled in [facility]. Patient denies HH, SNF, and DME. Patient lives with spouse and plans to return home with spouse to transport.                  Continued Care and Services - Admitted Since 5/15/2024    No active coordination exists for this encounter.       Expected Discharge Date and Time       Expected Discharge Date Expected Discharge Time    May 18, 2024            Demographic Summary       Row Name 05/17/24 1706       General Information    Admission Type inpatient    Required Notices Provided Important Message from Medicare    Referral Source admission list    Reason for Consult discharge planning    Preferred Language English                   Functional Status       Row Name 05/17/24 1706       Functional Status    Usual Activity Tolerance good    Current Activity Tolerance good       Functional Status, IADL    Medications independent    Meal Preparation independent    Housekeeping independent    Laundry independent    Shopping independent       Mental Status    General Appearance WDL WDL       Mental Status Summary    Recent Changes in Mental Status/Cognitive Functioning no changes                   Psychosocial    No documentation.                  Abuse/Neglect    No documentation.                  Legal    No documentation.                  Substance Abuse    No documentation.                  Patient Forms    No documentation.                     Kay Baldwin RN

## 2024-05-17 NOTE — ANESTHESIA PROCEDURE NOTES
Airway  Urgency: elective    Date/Time: 5/17/2024 7:36 AM  Airway not difficult    General Information and Staff    Patient location during procedure: OR  Anesthesiologist: Kenneth Ortiz DO  CRNA/CAA: Viraj Sears CRNA    Indications and Patient Condition  Indications for airway management: airway protection    Preoxygenated: yes  MILS maintained throughout  Mask difficulty assessment: 2 - vent by mask + OA or adjuvant +/- NMBA    Final Airway Details  Final airway type: endotracheal airway      Successful airway: ETT  Cuffed: yes   Successful intubation technique: direct laryngoscopy  Facilitating devices/methods: intubating stylet and cricoid pressure  Endotracheal tube insertion site: oral  Blade: Moore  Blade size: 2  ETT size (mm): 8.0  Cormack-Lehane Classification: grade I - full view of glottis  Placement verified by: chest auscultation and capnometry   Measured from: lips  ETT/EBT  to lips (cm): 22  Number of attempts at approach: 1  Assessment: lips, teeth, and gum same as pre-op and atraumatic intubation

## 2024-05-17 NOTE — ANESTHESIA POSTPROCEDURE EVALUATION
"Patient: Duy Valdez    Procedure Summary       Date: 05/17/24 Room / Location: [facility] OR [address] [place] [address] MAIN OR    Anesthesia Start: 0726 Anesthesia Stop: 0907    Procedure: Laparoscopic cholecystectomy with possible intraoperative cholangiogram (Abdomen) Diagnosis:       Cholecystitis      (Cholecystitis [K81.9])    Surgeons: Fabio Foster MD Provider: Kenneth Ortiz DO    Anesthesia Type: general ASA Status: 3            Anesthesia Type: general    Vitals  Vitals Value Taken Time   /74 05/17/24 1000   Temp 36.3 °C (97.4 °F) 05/17/24 0930   Pulse 92 05/17/24 1013   Resp 16 05/17/24 1000   SpO2 93 % 05/17/24 1013   Vitals shown include unfiled device data.        Post Anesthesia Care and Evaluation    Patient location during evaluation: bedside  Patient participation: complete - patient participated  Level of consciousness: awake and alert  Pain management: adequate    Airway patency: patent  Anesthetic complications: No anesthetic complications  PONV Status: controlled  Cardiovascular status: acceptable and hemodynamically stable  Respiratory status: acceptable, spontaneous ventilation and nonlabored ventilation  Hydration status: acceptable    Comments: /72 (BP Location: Left arm, Patient Position: Lying)   Pulse 80   Temp 36.4 °C (97.5 °F) (Oral)   Resp 16   Ht 180.3 cm (71\")   Wt 101 kg (222 lb 14.2 oz)   SpO2 95%   BMI 31.09 kg/m²           "

## 2024-05-17 NOTE — PLAN OF CARE
Goal Outcome Evaluation:  Plan of Care Reviewed With: patient        Progress: improving     Pt AxOx4, calm and cooperative. Takes pills whole with water, see MAR. Zheng wyatt done this AM. Pt sitting up in the recliner, ambulating independently in hallway. Tolerating Reg diet. No complains of pain, no nausea. Possible discharge home tomorrow. Plan of care ongoing.

## 2024-05-17 NOTE — ANESTHESIA PREPROCEDURE EVALUATION
Anesthesia Evaluation     Patient summary reviewed   no history of anesthetic complications:   NPO Solid Status: > 8 hours  NPO Liquid Status: > 2 hours           Airway   Mallampati: II  TM distance: >3 FB  Neck ROM: full  No difficulty expected  Dental      Pulmonary     breath sounds clear to auscultation  (+) a smoker Former, asthma,sleep apnea  (-) shortness of breath, recent URI  Cardiovascular     ECG reviewed  PT is on anticoagulation therapy  Patient on routine beta blocker and Beta blocker given within 24 hours of surgery  Rhythm: irregular  Rate: normal    (+) hypertension, dysrhythmias Atrial Fib  (-) past MI, angina      Neuro/Psych  (-) seizures, CVA  GI/Hepatic/Renal/Endo    (+) obesityRenal disease: Cr 0.83.    Musculoskeletal     (-) neck stiffness  Abdominal    Substance History      OB/GYN          Other        (-) blood dyscrasia (Hb 14.9)  ROS/Med Hx Other: Last Eliquis 5/15                  Anesthesia Plan    ASA 3     general     (I have reviewed the patient's history and chart with the patient, including all pertinent laboratory results and imaging. I have explained the risks of anesthesia including but not limited to dental damage, corneal abrasion, nerve injury, MI, stroke, aspiration, and death. Patient has agreed to proceed.  )  intravenous induction     Anesthetic plan, risks, benefits, and alternatives have been provided, discussed and informed consent has been obtained with: patient.    Use of blood products discussed with patient .        CODE STATUS:    Level Of Support Discussed With: Patient  Code Status (Patient has no pulse and is not breathing): CPR (Attempt to Resuscitate)  Medical Interventions (Patient has pulse or is breathing): Full Support

## 2024-05-17 NOTE — OP NOTE
OPERATIVE REPORT     DATE OF OPERATION: 05/17/24     SURGEON:   Fabio Foster MD    ASSISTANT:  Assistant: Evelyn Miranda RNFA was responsible for performing the following activities: Suturing, Closing, Placing Dressing, and Held/Positioned Camera and their skilled assistance was necessary for the success of this case.      PREOPERATIVE DIAGNOSIS: Acute cholecystitis    POSTOPERATIVE DIAGNOSIS: Same    PROCEDURE PERFORMED: Laparoscopic cholecystectomy with intraoperative cholangiogram    ANESTHESIA: General    SPECIMEN: Gallbladder    DRAINS: None    BLOOD LOSS: minimal    INDICATIONS FOR OPERATION: Mr Duy Valdez is a 78 y.o. year old with signs and symptoms consistent with acute cholecystitis. Laparoscopic cholecystectomy with cholangiogram was recommended. All risks (including bleeding, infection, damage to surrounding structures, bile duct injury, and bile leak), benefits, and alternatives were explained to the patient and he agreed and wished to proceed.  Informed consent was obtained.    FINDINGS:   Critical view of safety prior to performing cholangiogram  Normal intraoperative cholangiogram with retrohepatic filling of left and right hepatic duct, brisk emptying of contrast into duodenum without filling defect.    OPERATIVE REPORT: The patient was taken to the operating room, transferred onto the operating room table, and underwent general endotracheal anesthesia without incident. The patient was prepped and draped in the usual sterile fashion.  Preoperative antibiotics were given, and a timeout was performed.  Half percent Marcaine with epinephrine was and injected into the skin and subcutaneous tissues prior to all incisions.  After confirmation of an orogastric tube being placed, a veress needle was inserted at goodson's point.  A reassuring saline drop test was performed.  The abdomen was then entered through a periumbilical incision using an optiview technique.  The viscera underlying the  veress needle was inspected for evidence of injury and the veress needle withdrawn.  2 additional 5 mm trocars were placed in the right upper quadrant and an 11 mm trocar was placed below the xyphoid under direct visualization. The gallbladder was retracted cranially and laterally to allow for adequate visualization.  During dissection while grasping the fundus there was a hole created in the fundus of the gallbladder and this was used to decompress the gallbladder which allowed for easier grasping and better retraction.  The area around the infundibulum was dissected with hook cautery and blunt dissection until the cystic duct and artery were identified. A critical view of safety was obtained. A cholangiogram was done by placing a clip on the cystic duct and incising it just distal to this.  A cholangiogram catheter was introduced with an Angiocath needle and directed into the cystic duct.  A clip was applied.  With fluoroscopy contrast was injected and confirmed the anatomy and that there were no stones present.  Contrast was seen going into the right and left hepatic ducts as well as the duodenum.  The cholangiogram catheter was then removed.  The cystic duct had 2 clips placed on the bile duct side and was transected.  The cystic artery was also clipped and divided.  Bovie electrocautery was then used to remove the gallbladder from the liver bed.  The gallbladder was placed into a bag. Hemostasis was obtained with the hook cautery. The area was then irrigated and inspected for bleeding and bile leak.  No bleeding or bile was noted.  The gallbladder was then removed through the subxiphoid port.  A 0 vicryl was placed using a suture passer to reapproximate the fascia at the subxiphoid trocar site.The 11 mm trocar was reinserted and the remaining ports removed under direct visualization.  The subxiphoid trocar was removed, insufflation evacuated, and the fascial stitch tied after ensuring no herniation of bowel or  omentum. The ports were removed under direct visualization. The incisions were then closed with 4-0 monocryl sutures and Dermabond.  All needle and lap counts were correct at the end of the case.  The patient was awoken from general endotracheal anesthesia and taken to the recovery area for further monitoring.    Faboi Foster M.D.  General and Endoscopic Surgery  [facility]    [address] [place] [address], [address]  [place], [place], [address]  P: [phone]  F: [phone]

## 2024-05-18 VITALS
HEART RATE: 72 BPM | OXYGEN SATURATION: 96 % | DIASTOLIC BLOOD PRESSURE: 65 MMHG | WEIGHT: 227.74 LBS | RESPIRATION RATE: 16 BRPM | BODY MASS INDEX: 31.88 KG/M2 | SYSTOLIC BLOOD PRESSURE: 130 MMHG | HEIGHT: 71 IN | TEMPERATURE: 97.3 F

## 2024-05-18 PROBLEM — K81.9 CHOLECYSTITIS: Status: RESOLVED | Noted: 2024-05-15 | Resolved: 2024-05-18

## 2024-05-18 PROBLEM — K81.0 ACUTE CHOLECYSTITIS: Status: RESOLVED | Noted: 2024-05-16 | Resolved: 2024-05-18

## 2024-05-18 LAB
LAB AP CASE REPORT: NORMAL
PATH REPORT.FINAL DX SPEC: NORMAL
PATH REPORT.GROSS SPEC: NORMAL

## 2024-05-18 RX ORDER — TRAMADOL HYDROCHLORIDE 50 MG/1
50 TABLET ORAL EVERY 6 HOURS PRN
Qty: 15 TABLET | Refills: 0 | Status: SHIPPED | OUTPATIENT
Start: 2024-05-18 | End: 2024-05-22

## 2024-05-18 RX ADMIN — ATORVASTATIN CALCIUM 80 MG: 20 TABLET, FILM COATED ORAL at 08:22

## 2024-05-18 RX ADMIN — Medication 10 ML: at 08:22

## 2024-05-18 RX ADMIN — ACETAMINOPHEN 650 MG: 325 TABLET, FILM COATED ORAL at 08:22

## 2024-05-18 NOTE — DISCHARGE SUMMARY
Discharge summary: Observation stay    Date of admission: May 15, 2024    Date of discharge: May 18, 2024      DISCHARGE DIAGNOSES:  Acute cholecystitis  Elevated LFTs secondary to above  New right lower lobe noncalcified pulmonary nodules  Multiple small less than 1 cm hepatic lesions, probably cysts.  One is new compared with CT in October 2023.  Obstructive sleep apnea  Hypertension  History of calcium oxalate renal stones  Obesity  Age-related macular degeneration  BPH  Paroxysmal atrial fibrillation  History of multiple basal cell carcinomas of the skin  Vitamin D deficiency  Osteoarthritis  Coronary atherosclerosis  Hyperlipidemia  Allergic rhinitis/asthma  Mild thrombocytopenia      SURGERY: Laparoscopic cholecystectomy with intraoperative cholangiogram; Dr. Fabio Foster      BRIEF HISTORY: 78-year-old male was doing well until the morning of admission.  He woke at 7 AM with severe mid and left upper quadrant abdominal pain.  He classified the pain as 8/10 in intensity.  There was no nausea or vomiting.  No diarrhea or constipation.  However the pain was persistent.    He called my office and was seen that morning.  At that time the pain had eased to 5/10, but was still there.  Physical exam was fairly unremarkable.  Patient was set up for CT scan of the abdomen that day.  The CT scan suggested cholecystitis.  Therefore patient was hospitalized.      HOSPITAL COURSE: Patient had taken Eliquis on the morning of admission.  It was placed on hold.  Blood work was done and patient had elevated LFTs which was new.  Patient was begun on Zosyn.  An ultrasound of the gallbladder was done.  This was consistent with cholecystitis with small stones.  No common bile duct obstruction was seen on the ultrasound.  It was suspected patient had passed a small stone.    Patient was seen by Dr. Fabio Foster, general surgery.  Since patient was having very little pain at the time it was recommended that we delay surgery for 48  hours so that the Eliquis can get completely out of his system.    Yesterday patient underwent laparoscopic cholecystectomy with intraoperative cholangiogram.  There were no complications with the surgery.    It is of note that on the initial CT scan 3 new small right lower lobe pulmonary nodules were noted.  It is recommended patient get a follow-up CT scan in 2-3 months as an outpatient.  Also he had some indeterminate liver lesions which are most likely cysts.  One was new compared with a prior CT scan in October 2023.    Patient has done well postoperatively.  His LFTs have trended down.  He has been able to eat.  He is ambulating the floor.  He has had a little bit of soreness up under the right scapula.  He did require 1 tramadol for that today.  This will be followed as an outpatient.    At time of discharge temperature is 90.2, pulse 73, respiratory rate 15, /63, O2 sat 95% on room air.  Neck JVD physiologic.  Heart irregular regular rhythm with normal rate.  Lungs clear to auscultation.  Abdomen bowel sounds present.  Very minimal postsurgical tenderness to palpation.  No rebound or guarding.  Extremities: No edema.  Feet are warm.  Pedal pulses are palpable.  Neurologic exam: Nonfocal.      DATA BASE:       LABS:  At time of discharge white blood cell count 9100, hemoglobin 14.7, hematocrit 44.3, platelets 104,000.  Sodium 139, potassium 4.2, chloride 105, CO2 27, BUN 10, creatinine 0.83, glucose 100, calcium 8.5, alkaline phosphatase 126, total protein 6.0, albumin 4.0, AST 49, , total bilirubin 1.5 amylase 77, lipase 31.  Blood culture showed no growth at 2 days.  Urinalysis clear.      CXR FINDINGS:  There is cardiomegaly. There is no vascular congestion. No pneumothorax  or pleural effusion is seen. No acute infiltrates are seen. Postsurgical  changes are noted within the left humeral head.   IMPRESSION:  No acute findings.    EKG: Atrial fibrillation with controlled ventricular response.   Left anterior hemiblock.      CT ABD / PELVIS w contrast.   IMPRESSION:  1. Gallbladder wall thickening with cholelithiasis. This may represent  cholecystitis and findings need correlation with clinical data and could  be further evaluated with right upper quadrant sonogram.  2. Multiple 1 cm and smaller hepatic low-density lesions are most likely  cysts or hemangiomas though not definitively characterized. 1 cm lesion  superior aspect of the lateral segment left lobe of the liver not  clearly demonstrated on previous CT 10/20/2023.  3. Multiple subcentimeter renal cysts and renal lesions that are too  small to characterize.  4. Three 7 mm and smaller right lower lobe noncalcified pulmonary  nodules, new when compared to CT chest 10/20/2023. These are most likely  inflammatory or infectious in etiology though recommend follow-up with  noncontrast chest CT in 2 to 3 months.  5. Prostate gland enlargement.  6. Chronic-appearing compression deformity at L5. Previous  posterolateral instrumented fusion at L3-4.      OPERATIVE CHOLANGIOGRAM  HISTORY: Laparoscopic cholecystectomy with possible stones.   Contrast is seen in the intrahepatic, common hepatic, and common bile  ducts. No bile duct stones, strictures or biliary dilatation is seen.  There is free spill of contrast into the duodenum. The intrahepatic  ducts are incompletely included in the field-of-view.      DISCHARGE MEDICATIONS:  Amlodipine 2.5 mg p.o. daily  Arnuity elliptica 1 puff daily  Atorvastatin 80 mg daily  Astelin nasal spray as needed  Vitamin D 5000 units daily  Vitamin B12 250 mcg daily  Eliquis 5 mg every 12 hours  Toprol XL 25 mg; 1/2 tablet daily  Tramadol 50 mg every 6 hours as needed pain  Tylenol 500 mg 2 tablets every 8 hours as needed pain    DIET: Regular healthy heart    ACTIVITY: Ad radha.; do not lift over 20 pounds.    FOLLOW UP: With me in the office in 2 days.    Needs follow-up CT scan of the chest in 2-3 months.    Josué Landry MD

## 2024-05-18 NOTE — DISCHARGE INSTRUCTIONS
POST OP RECOMMENDATIONS  Dr. Fabio Foster  [facility]  [phone]  Discharge Gall Bladder Surgery    Go home, rest and take it easy today; however, you should get up and move about several times today to reduce the risk of developing a blood clot in your legs.   You may experience some dizziness or memory loss from the anesthesia. This may last for the next 24 hours. Someone should plan on staying with you for the first 24 hours for your safety.   Do not make any important legal decisions or sign any legal papers for the next 24 hours.   Eat and drink lightly today. Start off with bland foods at first. You may advance your diet tomorrow as tolerated as long as you do not experience any nausea or vomiting.   Your incisions are covered with skin glue.  This will peel off on its own in 1-2 weeks. If it falls off sooner then that is ok.   You may notice some bleeding/drainage on your outer dressings. A little bloody drainage is normal. If the bleeding/drainage is such that the bandage cannot absorb it, remove the dressing, apply clean gauze and apply firm pressure for a full 15 minutes. If the bleeding continues, please call me.   You may shower tomorrow. No tub baths until your incisions are completely healed.   You have received a prescription for a narcotic pain medicine, as you will have some pain following surgery.  You will not be totally pain free, but your pain medicine should make the pain tolerable. Please take your pain medicine as prescribed and always take your pills with food to prevent nausea. If you are having severe pain that cannot be controlled by the pain medicine, please contact me.   It is not unusual to experience pain/discomfort in your shoulders or your ribs after surgery. It is from the gas used during the laparoscopic procedure and usually lasts 1-3 days. The prescription pain medicine is used to treat the surgical pain and does not typically alleviate this “gassy” pain.    No driving for 24 hours and for as long as you are taking your prescription pain medicine.   You will need to call the office at [phone] to schedule a follow-up appointment in 14 days.   Remember to contact me for any of the following:   Fever > 101 degrees  Severe pain that cannot be controlled by taking your pain pills  Severe nausea or vomiting that cannot be controlled by taking your nausea pills  Significant bleeding of your incisions  Drainage that has a bad smell or is yellow or green in appearance  Any other questions or concerns

## 2024-05-19 NOTE — CASE MANAGEMENT/SOCIAL WORK
Case Management Discharge Note                Selected Continued Care - Discharged on 5/18/2024 Admission date: 5/15/2024 - Discharge disposition: Home or Self Care      Destination    No services have been selected for the patient.                Durable Medical Equipment    No services have been selected for the patient.                Dialysis/Infusion    No services have been selected for the patient.                Home Medical Care    No services have been selected for the patient.                Therapy    No services have been selected for the patient.                Community Resources    No services have been selected for the patient.                Community & DME    No services have been selected for the patient.                         Final Discharge Disposition Code: 01 - home or self-care

## 2024-05-19 NOTE — CASE MANAGEMENT/SOCIAL WORK
Case Management Discharge Note      Final Note: dc home         Selected Continued Care - Discharged on 5/18/2024 Admission date: 5/15/2024 - Discharge disposition: Home or Self Care      Destination    No services have been selected for the patient.                Durable Medical Equipment    No services have been selected for the patient.                Dialysis/Infusion    No services have been selected for the patient.                Home Medical Care    No services have been selected for the patient.                Therapy    No services have been selected for the patient.                Community Resources    No services have been selected for the patient.                Community & DME    No services have been selected for the patient.                         Final Discharge Disposition Code: 01 - home or self-care

## 2024-05-20 ENCOUNTER — TRANSCRIBE ORDERS (OUTPATIENT)
Dept: ADMINISTRATIVE | Facility: HOSPITAL | Age: 79
End: 2024-05-20
Payer: MEDICARE

## 2024-05-20 DIAGNOSIS — R91.1 LUNG NODULE: Primary | ICD-10-CM

## 2024-05-20 LAB
BACTERIA SPEC AEROBE CULT: NORMAL
BACTERIA SPEC AEROBE CULT: NORMAL

## 2024-06-06 ENCOUNTER — OFFICE VISIT (OUTPATIENT)
Dept: SURGERY | Facility: CLINIC | Age: 79
End: 2024-06-06
Payer: MEDICARE

## 2024-06-06 VITALS
SYSTOLIC BLOOD PRESSURE: 142 MMHG | DIASTOLIC BLOOD PRESSURE: 80 MMHG | HEIGHT: 71 IN | BODY MASS INDEX: 30.66 KG/M2 | WEIGHT: 219 LBS

## 2024-06-06 DIAGNOSIS — K81.9 CHOLECYSTITIS: Primary | ICD-10-CM

## 2024-06-07 PROBLEM — E66.811 OBESITY (BMI 30.0-34.9): Status: ACTIVE | Noted: 2024-06-07

## 2024-06-07 PROBLEM — E66.9 OBESITY (BMI 30.0-34.9): Status: ACTIVE | Noted: 2024-06-07

## 2024-06-07 NOTE — PROGRESS NOTES
ASSESSMENT/PLAN:    78-year-old gentleman status post laparoscopic cholecystectomy with intraoperative cholangiogram on 5/17/2024.  He is recovering well.  His incisions are in good order.  He has no ongoing restrictions related to the operation.  Pathology was reviewed with him today and was benign in nature.  He will follow-up with me on an as-needed basis.         CC:     Chief Complaint   Patient presents with    Post-op     Laparoscopic cholecystectomy with intraoperative cholangiogram 5/17/24          SOCIAL HISTORY:   Social Determinants of Health     Tobacco Use: Medium Risk (6/6/2024)    Patient History     Smoking Tobacco Use: Former     Smokeless Tobacco Use: Never     Passive Exposure: Not on file   Alcohol Use: Not At Risk (5/15/2024)    AUDIT-C     Frequency of Alcohol Consumption: Monthly or less     Average Number of Drinks: 1 or 2     Frequency of Binge Drinking: Never   Financial Resource Strain: Not on file   Food Insecurity: No Food Insecurity (5/17/2024)    Hunger Vital Sign     Worried About Running Out of Food in the Last Year: Never true     Ran Out of Food in the Last Year: Never true   Transportation Needs: No Transportation Needs (5/17/2024)    PRAPARE - Transportation     Lack of Transportation (Medical): No     Lack of Transportation (Non-Medical): No   Physical Activity: Not on file   Stress: Not on file   Social Connections: Unknown (10/10/2023)    Family and Community Support     Help with Day-to-Day Activities: Not on file     Lonely or Isolated: Not on file   Interpersonal Safety: Not At Risk (5/15/2024)    Abuse Screen     Unsafe at Home or Work/School: no     Feels Threatened by Someone?: no     Does Anyone Keep You from Contacting Others or Doint Things Outside the Home?: no     Physical Sign of Abuse Present: no   Depression: Not on file   Housing Stability: Not At Risk (5/17/2024)    Housing Stability     Current Living Arrangements: home     Potentially Unsafe Housing  Conditions: none   Utilities: Not At Risk (5/17/2024)    C Utilities     Threatened with loss of utilities: No   Health Literacy: Unknown (5/17/2024)    Education     Help with school or training?: Not on file     Preferred Language: English   Employment: Unknown (10/10/2023)    Employment     Do you want help finding or keeping work or a job?: Not on file   Disabilities: Not At Risk (5/15/2024)    Disabilities     Concentrating, Remembering, or Making Decisions Difficulty: no     Doing Errands Independently Difficulty: no        FAMILY HISTORY:    No family history on file.     OTHER SURGERY:  Past Surgical History:   Procedure Laterality Date    APPENDECTOMY  1953    BACK SURGERY  2018    L4-L5 with instrumentation    CARDIAC ABLATION      CHOLECYSTECTOMY WITH INTRAOPERATIVE CHOLANGIOGRAM N/A 5/17/2024    Procedure: Laparoscopic cholecystectomy with possible intraoperative cholangiogram;  Surgeon: Fabio Foster MD;  Location: [facility] OR;  Service: General;  Laterality: N/A;    CYSTOSCOPY BLADDER STONE LITHOTRIPSY      FRACTURE SURGERY Left 2020    Tib/fib with place    JOINT REPLACEMENT Left 1997    Shoulder    TONSILLECTOMY          PAST MEDICAL HISTORY:    Past Medical History:   Diagnosis Date    Asthma     Atrial fibrillation     BPH (benign prostatic hyperplasia)     Hypertension     Sleep apnea         MEDICATIONS:   Current Outpatient Medications on File Prior to Visit   Medication Sig Dispense Refill    acetaminophen (TYLENOL) 500 MG tablet 2 tablets.      amLODIPine (NORVASC) 2.5 MG tablet Take 1 tablet by mouth Daily.      atorvastatin (LIPITOR) 10 MG tablet Take 8 tablets by mouth Daily.      azelastine (ASTELIN) 0.1 % nasal spray azelastine 137 mcg (0.1 %) nasal spray aerosol   USE 1 SPRAY IN EACH NOSTRIL TWICE DAILY      Cholecalciferol 125 MCG (5000 UT) tablet cholecalciferol (vitamin D3) 125 mcg (5,000 unit) tablet   TAKE 1 TABLET BY MOUTH ONCE DAILY      cyanocobalamin (VITAMIN B-12) 250  "MCG tablet B12      Eliquis 5 MG tablet tablet Take 1 tablet by mouth 2 (Two) Times a Day.      Fluticasone Furoate (Arnuity Ellipta) 100 MCG/ACT aerosol powder  Inhale 1 puff Daily.      metoprolol succinate XL (TOPROL-XL) 25 MG 24 hr tablet 0.5 tablets Daily.       No current facility-administered medications on file prior to visit.        ALLERGIES:   No Known Allergies     Body mass index is 30.54 kg/m².  180.3 cm (71\")  99.3 kg (219 lb)      BMI is >= 30 and <35. (Class 1 Obesity). The following options were offered after discussion;: weight loss educational material (shared in after visit summary)      Fabio Foster M.D.  General and Endoscopic Surgery  [facility]    [address] [place] [address], [address]  [place], [place], [address]  P: [phone]  F: [phone]     "

## 2024-08-01 ENCOUNTER — HOSPITAL ENCOUNTER (OUTPATIENT)
Dept: CT IMAGING | Facility: HOSPITAL | Age: 79
Discharge: HOME | End: 2024-08-01
Admitting: INTERNAL MEDICINE
Payer: MEDICARE

## 2024-08-01 DIAGNOSIS — R91.1 LUNG NODULE: ICD-10-CM

## 2024-08-01 PROCEDURE — 71250 CT THORAX DX C-: CPT

## 2024-08-05 ENCOUNTER — TRANSCRIBE ORDERS (OUTPATIENT)
Dept: ADMINISTRATIVE | Facility: HOSPITAL | Age: 79
End: 2024-08-05
Payer: MEDICARE

## 2024-08-05 DIAGNOSIS — R91.8 MULTIPLE LUNG NODULES: Primary | ICD-10-CM

## 2024-09-04 ENCOUNTER — HOSPITAL ENCOUNTER (OUTPATIENT)
Facility: HOSPITAL | Age: 79
Discharge: HOME | End: 2024-09-04
Admitting: INTERNAL MEDICINE
Payer: MEDICARE

## 2024-09-04 DIAGNOSIS — R91.8 MULTIPLE LUNG NODULES: ICD-10-CM

## 2024-09-04 PROCEDURE — 71250 CT THORAX DX C-: CPT

## 2024-10-29 ENCOUNTER — TRANSCRIBE ORDERS (OUTPATIENT)
Dept: ADMINISTRATIVE | Facility: HOSPITAL | Age: 79
End: 2024-10-29
Payer: MEDICARE

## 2024-10-29 DIAGNOSIS — R91.8 LUNG NODULES: Primary | ICD-10-CM

## 2024-11-21 ENCOUNTER — HOSPITAL ENCOUNTER (OUTPATIENT)
Facility: HOSPITAL | Age: 79
Discharge: HOME | End: 2024-11-21
Admitting: INTERNAL MEDICINE
Payer: MEDICARE

## 2024-11-21 DIAGNOSIS — R91.8 LUNG NODULES: ICD-10-CM

## 2024-11-21 PROCEDURE — 71250 CT THORAX DX C-: CPT

## 2024-12-13 ENCOUNTER — TRANSCRIBE ORDERS (OUTPATIENT)
Dept: ADMINISTRATIVE | Facility: HOSPITAL | Age: 79
End: 2024-12-13
Payer: MEDICARE

## 2024-12-13 DIAGNOSIS — R91.1 LUNG NODULE: Primary | ICD-10-CM

## 2025-03-04 ENCOUNTER — HOSPITAL ENCOUNTER (OUTPATIENT)
Facility: HOSPITAL | Age: 80
Discharge: HOME | End: 2025-03-04
Admitting: INTERNAL MEDICINE
Payer: MEDICARE

## 2025-03-04 DIAGNOSIS — R91.1 LUNG NODULE: ICD-10-CM

## 2025-03-04 PROCEDURE — 71250 CT THORAX DX C-: CPT

## 2025-03-17 ENCOUNTER — TRANSCRIBE ORDERS (OUTPATIENT)
Dept: ADMINISTRATIVE | Facility: HOSPITAL | Age: 80
End: 2025-03-17
Payer: MEDICARE

## 2025-03-17 DIAGNOSIS — K76.9 LIVER LESION: Primary | ICD-10-CM

## 2025-03-17 DIAGNOSIS — R91.1 PULMONARY NODULE: Primary | ICD-10-CM

## 2025-04-02 ENCOUNTER — HOSPITAL ENCOUNTER (OUTPATIENT)
Dept: GENERAL RADIOLOGY | Facility: HOSPITAL | Age: 80
Discharge: HOME | End: 2025-04-02
Admitting: INTERNAL MEDICINE
Payer: MEDICARE

## 2025-04-02 DIAGNOSIS — M25.572 PAIN IN JOINT, FOOT, LEFT: ICD-10-CM

## 2025-04-02 PROCEDURE — 73610 X-RAY EXAM OF ANKLE: CPT

## 2025-04-17 NOTE — H&P (VIEW-ONLY)
HISTORY AND PHYSICAL      Duy Valdez  1945  [id number]  4/17/2025      CHIEF COMPLAINT:   Chief Complaint   Patient presents with   • Leg Pain     Left leg pain       HPI: Duy Valdez is a 79 yr/o male with a history of chronic low back pain for more than 2 months.  The pain radiates to LLE into the ankle .  The pain is described as an aching, throbbing pain.  The pain intensity is 8 on a scale of 0-10.  The pain is exacerbated by physical activity such as standing or long periods of time.  The patient has difficulty performing activities of daily living such as cooking and cleaning. The patient has a history of  lumbar discectomy at L3-4 with foraminotomies with fusion on 6/11/2018.   The pain is not responding to conservative therapy.  The patient has tried NSAID'S for more than  6 weeks with no pain relief. The patient has tried a home exercise program for more than 8 weeks with no pain relief. The patient denies weakness of lower extremities.  The patient denies bowel or bladder dysfunction.    Past Medical History:   Diagnosis Date   • Abnormal EKG 2016    HX OF / STATE HAD ABNORMAL EKG ~2015 WITH PCP / SENT TO DR MAYER HAD TESTING DONE / WAS TOLD ABNORMAL EKG WAS R/T TO LOW K+    • Atrial fibrillation     HX OF MARCH 2017 WHILE HOSPITALIZED WITH PNUEMONIA / PT DENIES ANY HX OF PRIOR A FIB / CONVERTED ON HIS OWN/ DENIES ANY PROBLEM SINCE   • Cancer     basal cell carcinoma-left nose and head removed 2011   • Deafness     right ear   • Low back pain radiating to both legs    • Numbness and tingling     LEFT FOOT   • Pneumonia 2017    [facility]PITALIZED AT [facility]   • Seasonal allergies    • Shoulder pain     RIGHT-has had for a long time   • Skin cancer     HX OF       Family History   Problem Relation Name Age of Onset   • Cancer, Other or Unknown Type Mother     • Stroke Mother     • Suicide Completed Father     • Cancer, Other or Unknown Type Sister         Social History     Socioeconomic  History   • Marital status:      Spouse name: Not on file   • Number of children: Not on file   • Years of education: Not on file   • Highest education level: Not on file   Occupational History   • Not on file   Tobacco Use   • Smoking status: Former     Current packs/day: 0.00     Average packs/day: 2.0 packs/day for 15.0 years (30.0 ttl pk-yrs)     Types: Cigarettes     Start date: 1961     Quit date: 1976     Years since quittin.4   • Smokeless tobacco: Never   • Tobacco comments:     quit in    Vaping Use   • Vaping status: Never Used   Substance and Sexual Activity   • Alcohol use: Not Currently     Alcohol/week: 1.0 standard drink of alcohol     Types: 1 Glasses of wine per week     Comment: Rarely   • Drug use: No   • Sexual activity: Not on file   Other Topics Concern   • Not on file   Social History Narrative   • Not on file     Social Drivers of Health     Financial Resource Strain: Not on file   Food Insecurity: No Food Insecurity (2024)    Received from [facility]    Hunger Vital Sign    • Worried About Running Out of Food in the Last Year: Never true    • Ran Out of Food in the Last Year: Never true    • Worried About Running Out of Food in the Last Year: Never true    • Ran Out of Food in the Last Year: Never true    • Worried About Running Out of Food in the Last Year: Never true    • Ran Out of Food in the Last Year: Never true    • Worried About Running Out of Food in the Last Year: Never true    • Ran Out of Food in the Last Year: Never true    • Worried About Running Out of Food in the Last Year: Never true    • Ran Out of Food in the Last Year: Never true    • Worried About Running Out of Food in the Last Year: Never true    • Ran Out of Food in the Last Year: Never true   Transportation Needs: No Transportation Needs (2024)    Received from [facility] - Transportation    • In the past 12 months, has lack of transportation kept you from medical appointments or  from getting medications?: No    • In the past 12 months, has lack of transportation kept you from meetings, work, or from getting things needed for daily living?: No    • In the past 12 months, has lack of transportation kept you from medical appointments or from getting medications?: No    • In the past 12 months, has lack of transportation kept you from meetings, work, or from getting things needed for daily living?: No    • In the past 12 months, has lack of transportation kept you from medical appointments or from getting medications?: No    • In the past 12 months, has lack of transportation kept you from meetings, work, or from getting things needed for daily living?: No    • In the past 12 months, has lack of transportation kept you from medical appointments or from getting medications?: No    • In the past 12 months, has lack of transportation kept you from meetings, work, or from getting things needed for daily living?: No    • In the past 12 months, has lack of transportation kept you from medical appointments or from getting medications?: No    • In the past 12 months, has lack of transportation kept you from meetings, work, or from getting things needed for daily living?: No    • In the past 12 months, has lack of transportation kept you from medical appointments or from getting medications?: No    • In the past 12 months, has lack of transportation kept you from meetings, work, or from getting things needed for daily living?: No   Physical Activity: Not on file   Stress: Not on file   Social Connections: Unknown (10/10/2023)    Received from [facility]    Family and Community Support    • Help with Day-to-Day Activities: Not on file    • Lonely or Isolated: Not on file   Intimate Partner Violence: Not At Risk (5/15/2024)    [facility]    Abuse Screen    • Feels Unsafe at Home or Work/School: no    • Feels Threatened by Someone: no  "   • Does Anyone Try to Keep You From Having Contact with Others or Doing Things Outside Your Home?: no    • Physical Signs of Abuse Present: no   Housing Stability: Not At Risk (5/17/2024)    [facility]    H[facility] Stability    • Current Living Arrangements: home    • Potentially Unsafe Housing Conditions: none       Past Surgical History:   Procedure Laterality Date   • ANTERIOR FUSION LUMBAR SPINE     • APPENDECTOMY     • COLON SURGERY      at age 10 with appendectomy-anastomosis   • COLONOSCOPY     • FRACTURE SURGERY      left shoulder   • HERNIA REPAIR  1997    right inguinal   • LASIK     • SKIN SURGERY      BASAL CELL    • TONSILLECTOMY         Allergies as of 4/17/2025  Review status set to Review Complete by Anahi Lira RN on 4/17/2025   No Known Allergies         Current Medications[1]    Immunizations:    Immunization History       See Immunization Record              REVIEW OF SYSTEMS:  Constitutional:  The patient denies fever, chills or night sweats.  Head and neck: Denies rhinorrhea, otorrhea or visual disturbances.  Cardiovascular: Denies chest pain or cardiac dysrhythmia.  Respiratory: Denies shortness of breath, wheezing or chronic cough.  Gastrointestinal: Denies heartburn, cervical pain, diarrhea, constipation, bleeding or dark stools.  Genitourinary:  Denies difficulty urinating, burning micturition.  Hematological: Denies easy bruising or bleeding tendencies.  Musculoskeletal: Chronic low back pain.    PHYSICAL EXAM:    Vitals:  BP (!) 146/79 (BP Location: Right arm, Orthostatic Position: Sitting)   Pulse (!) 55   Temp 97 °F (36.1 °C)   Ht 5' 11\" (1.803 m)   Wt 215 lb (97.5 kg)   SpO2 95%   BMI 29.99 kg/m² ,  General:  The patient is a 79 yr/o male    Head/Neck:  Atraumatic, normocephalic.  Pupils equal, round, and reactive to light and accommodation.  Neck - no thyromegaly or lymphadenopathy.    Chest:  Clear.  Heart:  S1 and " S2 heard. Regular rate and rhythm.   Neurological:  The patient is awake, alert, and oriented. Cranial nerves II-XII grossly intact.  Patient has 5/5 strength of upper and lower extremities.  Deep tendon reflexes are 2+ and no loss of pinprick sensation.   Musculoskeletal:  The patient ambulates with normal gait.  Decreased range of motion of lumbar spine.  Tenderness in the lumbar region at L5-S1 levels in the midline and paraspinal region. SLR POSITIVE 15° on the left    Abdomen:   Soft and nontender.    Skin:  Warm and dry. Surgical scar from L4 to S1      DIAGNOSTICS:   MRI LUMBAR SPINE WITHOUT CONTRAST     DATE: 04/04/2025 7:42 AM     CLINICAL HISTORY: Low back pain, symptoms persist with > 6 wks  treatment  Low back pain, prior surgery, new symptoms     COMPARISONS: Radiographs March 18, 2025, MRI July 6, 2023     TECHNIQUE:  Multisequence, multiplanar MR images of the Lumbar spine  are provided without IV contrast.      FINDINGS:         Normal segmentation. Straightened lordosis. Similar retrolisthesis L3  on L4. Posterior fusion instrumentation spans L3-4. Stable disc height  loss L3-4 and superior endplate depression L5. No aggressive bone  lesion. No bone marrow edema evident.     Conus terminates at L1. No abnormal thickening or clumping of the  cauda equina nerve roots.     T12-L1: Negative.  L1-2: Negative.  L2-3: Negative.  L3-4: Prior posterior decompression. Canal is patent. Endplate  spurring with mild foraminal narrowing.  L4-5: Minimal disc bulge. Facet arthropathy. There is a lobular  intraspinal left facet cyst measuring up to 13 mm axial series 9 image  18.. There is some epidural fat dorsally. There is resultant severe  canal and lateral recess stenosis which is increased compared to the  prior. Moderate right and mild-to-moderate left foraminal stenosis.  L5-S1: Disc bulge and facet arthropathy. Mild lateral recess  narrowing. Mild-to-moderate foraminal narrowing.     IMPRESSION:  Severe  canal and lateral recess stenosis at L4-5 is increased compared  to the prior. A lobular 13 mm intraspinal left facet cyst contributes.        Dictated by: Jay Archer M.D.     Images and Report reviewed and interpreted by: Jay Archer M.D.     <PS><Electronically signed by: Jay Archer M.D.>  04/04/2025 1302     D: 04/04/2025 1258  T: 04/04/2025 1258    ASSESSMENT:  1.  Chronic low back pain.  2.  Lumbar spinal stenosis.  3.  Lumbar radiculopathy.  4.  S/p Lumbar fusion at L3-L4     PLAN:    Based on today's evaluation, we recommend a lumbar epidural steroid injection under fluoroscopic guidance.  The procedure was discussed in detail with the patient including risks and benefits.  The risks include but not limited to bleeding, infection, reaction to medications, possible dural puncture, possible post -dural puncture headache, possible exacerbation of pain, inability to relieve pain.  The patient understands the risks and benefits and wants to proceed.    CONSENT TO PROCEDURE/ SEDATION:    * Information provided regarding procedure :  Yes  * Risk/benefits discussed : Yes  * Alternative/recovery discussed : Yes  * Patient consents to planned procedure/ sedation      and accurately recounts discussion : Yes    We thank SALINA Ballesteros for allowing [facility] to participate in the care of this patient.               [1]    Current Outpatient Medications:   •  acetaminophen (TYLENOL) 500 MG tablet, 1,000 mg., Disp: , Rfl:   •  albuterol  (90 Base) MCG/ACT inhaler, Inhale 2 puffs every 4-6 hours by inhalation route as needed., Disp: , Rfl:   •  amiodarone (PACERONE) 200 MG tablet, Take 200 mg by mouth daily. (Patient not taking: Reported on 9/17/2024.), Disp: , Rfl:   •  amLODIPine (NORVASC) 2.5 MG tablet, Take 2.5 mg by mouth daily., Disp: , Rfl:   •  apixaban (ELIQUIS) 5 MG tablet, Eliquis 5 mg tablet  TAKE 1 TABLET BY MOUTH TWICE DAILY, Disp: , Rfl:   •  ARNUITY ELLIPTA 100  MCG/ACT inhaler, Inhale 1 puff into the lungs daily., Disp: , Rfl:   •  atorvastatin (LIPITOR) 80 MG tablet, 80 mg., Disp: , Rfl:   •  azelastine (ASTELIN) 0.1 % nasal spray, azelastine 137 mcg (0.1 %) nasal spray aerosol  USE 1 SPRAY IN EACH NOSTRIL TWICE DAILY, Disp: , Rfl:   •  cetirizine (ZYRTEC) 10 MG tablet, 10 mg. (Patient not taking: Reported on 9/17/2024.), Disp: , Rfl:   •  cholecalciferol 25 MCG (1000 UT) tablet, Take 1 tablet by mouth daily., Disp: , Rfl:   •  cholecalciferol TABS, cholecalciferol (vitamin D3) 125 mcg (5,000 unit) tablet  TAKE 1 TABLET BY MOUTH ONCE DAILY (Patient not taking: Reported on 9/17/2024.), Disp: , Rfl:   •  colestipol (COLESTID) 1 g tablet, TAKE 2 TABLETS BY MOUTH A DAY. IF PERSISTENT DIARRHEA INCREASE TO 2 TABLETS TWICE A DAY, UP TO 2 TABLETS 4 TIMES A DAY IF NECESSARY. (Patient not taking: Reported on 9/17/2024.), Disp: , Rfl:   •  cyanocobalamin 250 MCG tablet, B12, Disp: , Rfl:   •  cyclobenzaprine (FLEXERIL) 5 MG tablet, Take 1 tablet by mouth nightly as needed for Muscle spasms., Disp: 30 tablet, Rfl: 0  •  fluticasone (FLOVENT HFA) 110 MCG/ACT inhaler, Inhale 1 puff into the lungs 2 (two) times daily. (Patient not taking: Reported on 9/17/2024.), Disp: , Rfl:   •  imiquimod (ALDARA) 5 % cream, APPLY 1 PACKET TO SCALP SKIN TWICE A WEEK FOR 16 WEEKS, Disp: , Rfl:   •  ketorolac (ACULAR) 0.5 % ophthalmic solution, INSTILL 1 DROP INTO LEFT EYE 4 TIMES DAILY. START 3 DAYS PRIOR TO SURGERY AND CONTINUE ACCORDING TO POST SURGERY INSTRUCTIONS (Patient not taking: Reported on 9/17/2024.), Disp: , Rfl:   •  metoprolol, TOPROL-XL, 25 MG 24 hr tablet, metoprolol succinate ER 25 mg tablet,extended release 24 hr  TAKE 1 TABLET BY MOUTH ONCE DAILY, Disp: , Rfl:   •  mycophenolate (CELLCEPT) 500 MG tablet, Take 1,500 mg by mouth 2 (two) times daily., Disp: , Rfl:   •  pantoprazole (PROTONIX) 40 MG tablet, TAKE 1 TABLET BY MOUTH ONCE DAILY 30 MINS TO 2 HOURS BEFORE DINNER, Disp: , Rfl:    •  prednisoLONE acetate (PRED FORTE) 1 % ophthalmic suspension, INSTILL 1 DROP INTO LEFT EYE 4 TIMES DAILY START 3 DAYS BEFORE SURGERY AND CONTINUE ACCORDING TO POST SURGERY INSTRUCTIONS (Patient not taking: Reported on 9/17/2024.), Disp: , Rfl:

## 2025-04-21 ENCOUNTER — HOSPITAL ENCOUNTER (OUTPATIENT)
Facility: HOSPITAL | Age: 80
Discharge: HOME | End: 2025-04-21
Admitting: INTERNAL MEDICINE
Payer: MEDICARE

## 2025-04-21 DIAGNOSIS — K76.9 LIVER LESION: ICD-10-CM

## 2025-04-21 DIAGNOSIS — R91.1 PULMONARY NODULE: ICD-10-CM

## 2025-04-21 PROCEDURE — 25510000001 IOPAMIDOL 61 % SOLUTION: Performed by: INTERNAL MEDICINE

## 2025-04-21 PROCEDURE — 74170 CT ABD WO CNTRST FLWD CNTRST: CPT

## 2025-04-21 PROCEDURE — 71250 CT THORAX DX C-: CPT

## 2025-04-21 RX ORDER — IOPAMIDOL 612 MG/ML
100 INJECTION, SOLUTION INTRAVASCULAR
Status: COMPLETED | OUTPATIENT
Start: 2025-04-21 | End: 2025-04-21

## 2025-04-21 RX ADMIN — IOPAMIDOL 85 ML: 612 INJECTION, SOLUTION INTRAVENOUS at 14:42

## 2025-04-28 ENCOUNTER — TRANSCRIBE ORDERS (OUTPATIENT)
Dept: GENERAL RADIOLOGY | Facility: HOSPITAL | Age: 80
End: 2025-04-28
Payer: MEDICARE

## 2025-04-28 DIAGNOSIS — R91.1 NODULE OF UPPER LOBE OF RIGHT LUNG: Primary | ICD-10-CM

## 2025-05-05 ENCOUNTER — HOSPITAL ENCOUNTER (OUTPATIENT)
Dept: CT IMAGING | Facility: HOSPITAL | Age: 80
Discharge: HOME | End: 2025-05-05
Payer: MEDICARE

## 2025-05-05 ENCOUNTER — HOSPITAL ENCOUNTER (OUTPATIENT)
Dept: GENERAL RADIOLOGY | Facility: HOSPITAL | Age: 80
Discharge: HOME | End: 2025-05-05
Payer: MEDICARE

## 2025-05-05 VITALS
HEART RATE: 64 BPM | BODY MASS INDEX: 29.68 KG/M2 | WEIGHT: 212 LBS | TEMPERATURE: 98 F | SYSTOLIC BLOOD PRESSURE: 133 MMHG | DIASTOLIC BLOOD PRESSURE: 80 MMHG | RESPIRATION RATE: 18 BRPM | HEIGHT: 71 IN | OXYGEN SATURATION: 92 %

## 2025-05-05 DIAGNOSIS — R91.1 NODULE OF UPPER LOBE OF RIGHT LUNG: ICD-10-CM

## 2025-05-05 LAB
INR PPP: 1.2 (ref 0.8–1.2)
PLATELET # BLD AUTO: 119 10*3/MM3 (ref 140–450)
PROTHROMBIN TIME: 11.7 SECONDS (ref 12.8–15.2)

## 2025-05-05 PROCEDURE — 25010000002 LIDOCAINE 1 % SOLUTION: Performed by: RADIOLOGY

## 2025-05-05 PROCEDURE — 88305 TISSUE EXAM BY PATHOLOGIST: CPT | Performed by: INTERNAL MEDICINE

## 2025-05-05 PROCEDURE — 87205 SMEAR GRAM STAIN: CPT | Performed by: INTERNAL MEDICINE

## 2025-05-05 PROCEDURE — 87070 CULTURE OTHR SPECIMN AEROBIC: CPT | Performed by: INTERNAL MEDICINE

## 2025-05-05 PROCEDURE — 87176 TISSUE HOMOGENIZATION CULTR: CPT | Performed by: INTERNAL MEDICINE

## 2025-05-05 PROCEDURE — 88341 IMHCHEM/IMCYTCHM EA ADD ANTB: CPT | Performed by: INTERNAL MEDICINE

## 2025-05-05 PROCEDURE — 99152 MOD SED SAME PHYS/QHP 5/>YRS: CPT

## 2025-05-05 PROCEDURE — 71045 X-RAY EXAM CHEST 1 VIEW: CPT

## 2025-05-05 PROCEDURE — 88312 SPECIAL STAINS GROUP 1: CPT | Performed by: INTERNAL MEDICINE

## 2025-05-05 PROCEDURE — 87102 FUNGUS ISOLATION CULTURE: CPT | Performed by: INTERNAL MEDICINE

## 2025-05-05 PROCEDURE — 87206 SMEAR FLUORESCENT/ACID STAI: CPT | Performed by: INTERNAL MEDICINE

## 2025-05-05 PROCEDURE — 85049 AUTOMATED PLATELET COUNT: CPT | Performed by: RADIOLOGY

## 2025-05-05 PROCEDURE — 87116 MYCOBACTERIA CULTURE: CPT | Performed by: INTERNAL MEDICINE

## 2025-05-05 PROCEDURE — 88342 IMHCHEM/IMCYTCHM 1ST ANTB: CPT | Performed by: INTERNAL MEDICINE

## 2025-05-05 PROCEDURE — 85610 PROTHROMBIN TIME: CPT

## 2025-05-05 PROCEDURE — 25010000002 MIDAZOLAM PER 1 MG: Performed by: RADIOLOGY

## 2025-05-05 PROCEDURE — 25010000002 FENTANYL CITRATE (PF) 50 MCG/ML SOLUTION: Performed by: RADIOLOGY

## 2025-05-05 RX ORDER — SODIUM CHLORIDE 0.9 % (FLUSH) 0.9 %
10 SYRINGE (ML) INJECTION AS NEEDED
Status: DISCONTINUED | OUTPATIENT
Start: 2025-05-05 | End: 2025-05-06 | Stop reason: HOSPADM

## 2025-05-05 RX ORDER — LIDOCAINE HYDROCHLORIDE 10 MG/ML
20 INJECTION, SOLUTION INFILTRATION; PERINEURAL ONCE
Status: COMPLETED | OUTPATIENT
Start: 2025-05-05 | End: 2025-05-05

## 2025-05-05 RX ORDER — MYCOPHENOLATE MOFETIL 500 MG/1
3 TABLET ORAL 2 TIMES DAILY
COMMUNITY
Start: 2025-03-11

## 2025-05-05 RX ORDER — PANTOPRAZOLE SODIUM 40 MG/1
40 TABLET, DELAYED RELEASE ORAL DAILY
COMMUNITY
Start: 2024-12-20

## 2025-05-05 RX ORDER — MIDAZOLAM HYDROCHLORIDE 1 MG/ML
INJECTION, SOLUTION INTRAMUSCULAR; INTRAVENOUS AS NEEDED
Status: COMPLETED | OUTPATIENT
Start: 2025-05-05 | End: 2025-05-05

## 2025-05-05 RX ORDER — SODIUM CHLORIDE 9 MG/ML
25 INJECTION, SOLUTION INTRAVENOUS ONCE
Status: DISCONTINUED | OUTPATIENT
Start: 2025-05-05 | End: 2025-05-06 | Stop reason: HOSPADM

## 2025-05-05 RX ORDER — FENTANYL CITRATE 50 UG/ML
INJECTION, SOLUTION INTRAMUSCULAR; INTRAVENOUS AS NEEDED
Status: COMPLETED | OUTPATIENT
Start: 2025-05-05 | End: 2025-05-05

## 2025-05-05 RX ORDER — SODIUM CHLORIDE 0.9 % (FLUSH) 0.9 %
3 SYRINGE (ML) INJECTION EVERY 12 HOURS SCHEDULED
Status: DISCONTINUED | OUTPATIENT
Start: 2025-05-05 | End: 2025-05-06 | Stop reason: HOSPADM

## 2025-05-05 RX ADMIN — MIDAZOLAM 1 MG: 1 INJECTION INTRAMUSCULAR; INTRAVENOUS at 10:17

## 2025-05-05 RX ADMIN — FENTANYL CITRATE 50 MCG: 50 INJECTION, SOLUTION INTRAMUSCULAR; INTRAVENOUS at 10:17

## 2025-05-05 RX ADMIN — LIDOCAINE HYDROCHLORIDE 20 ML: 10 INJECTION, SOLUTION INFILTRATION; PERINEURAL at 10:16

## 2025-05-05 NOTE — POST-PROCEDURE NOTE
POST PROCEDURE NOTE    Procedure: lung bx    Pre-Procedure Diagnosis: nodule    Post-procedure Diagnosis: same    Findings: successful bx, bloodpatch    Complications: none    Blood loss: min    Specimen Removed: 3 cores    Disposition:   Discharge home

## 2025-05-05 NOTE — DISCHARGE INSTRUCTIONS
EDUCATION /DISCHARGE INSTRUCTIONS  CT/US guided biopsy:  A biopsy is a procedure done to remove tissue for further analysis.  Before images are taken to locate the target area.  Images can be obtained using ultrasound, CT or MRI.  A physician will clean your skin with antiseptic soap, place a sterile towel around the site and administer a local anesthetic to numb the area.  The physician will then insert a special needle.  Sometimes images are taken of the needle after it is inserted to ensure the needle is in the correct area to be biopsied.   A sample is obtained and sent to the laboratory for study.  Occasionally the laboratory is unable to make a diagnosis from the sample and the procedure may need to be repeated.  Within a week the radiologist will send a report to your physician.  A pathologist will also examine the tissue and send a report.    Risks of the procedure include but are not limited to:   *  Bleeding    *  Infection   *  Puncture of surrounding organs *  Death     *  Lung collapse if the biopsy is near the chest which may require insertion of a  chest tube to re-inflate the lung if severe.    Benefits of the procedure:  Using x-ray helps to locate the area that requires a biopsy. The procedure is less invasive than a surgical procedure, there are no large incisions and it does not require anesthesia.    Alternatives to the procedure:  A biopsy can be performed surgically.  Risks of a surgical biopsy include exposure to anesthesia, infection, excessive bleeding and injury to abdominal organs.  A benefit of surgical biopsy is the ability to see the area to be biopsied and remove of a larger piece of tissue.    Post Procedure:    *  Expect the biopsy site may be tender up to one week.    *  Rest today (no pushing pulling or straining).   *  Slowly increase activity tomorrow.    *  If you received sedation do not drive for 24 hours.   *  Keep dressing clean and dry.   *  Leave dressing on puncture site  for 24 hours.    *  You may shower when dressing removed.  Call your doctor if experiencing:   *  Signs of infection such as redness, swelling, excessive pain and / or foul        smelling drainage from the puncture site.   *  Chills or fever over 101 degrees (by mouth).   *  Unrelieved pain.   *  Any new or severe symptoms.   *  If experiencing sudden / severe shortness of breath or chest pain go to the       nearest emergency room.   Following the procedure:     Follow-up with the ordering physician as directed.    Continue to take other medications as directed by your physician unless    otherwise instructed.   If applicable, resume taking your blood thinners or Aspirin on __05/06/2025_.    If you have any concerns please call the Radiology Nurses Desk at [phone].  You are the most important factor in your recovery.  Follow the above instructions carefully.

## 2025-05-05 NOTE — NURSING NOTE
No Signs/Symptoms of distress noted. Patient taken by wheelchair to Patient Discharge to meet their ride/his wife.

## 2025-05-07 LAB
CYTO UR: NORMAL
LAB AP CASE REPORT: NORMAL
LAB AP CLINICAL INFORMATION: NORMAL
LAB AP DIAGNOSIS COMMENT: NORMAL
LAB AP SPECIAL STAINS: NORMAL
PATH REPORT.FINAL DX SPEC: NORMAL
PATH REPORT.GROSS SPEC: NORMAL

## 2025-05-08 LAB
BACTERIA SPEC AEROBE CULT: NORMAL
GRAM STN SPEC: NORMAL

## 2025-05-16 ENCOUNTER — TRANSCRIBE ORDERS (OUTPATIENT)
Dept: ADMINISTRATIVE | Facility: HOSPITAL | Age: 80
End: 2025-05-16
Payer: MEDICARE

## 2025-05-16 DIAGNOSIS — R91.8 PULMONARY NODULES: Primary | ICD-10-CM

## 2025-06-01 LAB — FUNGUS WND CULT: NORMAL

## 2025-06-16 LAB
MYCOBACTERIUM SPEC CULT: NORMAL
NIGHT BLUE STAIN TISS: NORMAL

## 2025-08-14 ENCOUNTER — HOSPITAL ENCOUNTER (OUTPATIENT)
Facility: HOSPITAL | Age: 80
Discharge: HOME | End: 2025-08-14
Admitting: INTERNAL MEDICINE
Payer: MEDICARE

## 2025-08-14 DIAGNOSIS — R91.8 PULMONARY NODULES: ICD-10-CM

## 2025-08-14 PROCEDURE — 71250 CT THORAX DX C-: CPT

## (undated) DEVICE — DISPOSABLE MONOPOLAR ENDOSCOPIC CORD 10 FT. (3M): Brand: KIRWAN

## (undated) DEVICE — ADHS SKIN SURG TISS VISC PREMIERPRO EXOFIN HI/VISC FAST/DRY

## (undated) DEVICE — SUT MNCRYL PLS ANTIB UD 4/0 PS2 18IN

## (undated) DEVICE — COVER,C-ARM,41X74: Brand: MEDLINE

## (undated) DEVICE — ENDOPATH XCEL BLADELESS TROCARS WITH STABILITY SLEEVES: Brand: ENDOPATH XCEL

## (undated) DEVICE — ENDOPOUCH RETRIEVER SPECIMEN RETRIEVAL BAGS: Brand: ENDOPOUCH RETRIEVER

## (undated) DEVICE — ENDOPATH XCEL UNIVERSAL TROCAR STABLILITY SLEEVES: Brand: ENDOPATH XCEL

## (undated) DEVICE — PATIENT RETURN ELECTRODE, SINGLE-USE, CONTACT QUALITY MONITORING, ADULT, WITH 9FT CORD, FOR PATIENTS WEIGING OVER 33LBS. (15KG): Brand: MEGADYNE

## (undated) DEVICE — DEV SUT GRSPR CLOSUR 15CM 14G

## (undated) DEVICE — ENDOPATH PNEUMONEEDLE INSUFFLATION NEEDLES WITH LUER LOCK CONNECTORS 120MM: Brand: ENDOPATH

## (undated) DEVICE — LAPAROSCOPIC SMOKE FILTRATION SYSTEM: Brand: PALL LAPAROSHIELD® PLUS LAPAROSCOPIC SMOKE FILTRATION SYSTEM

## (undated) DEVICE — LAPAROVUE VISIBILITY SYSTEM LAPAROSCOPIC SOLUTIONS: Brand: LAPAROVUE

## (undated) DEVICE — SUT VIC 0 UR6 27IN VCP603H

## (undated) DEVICE — STPCK 3WY D201 DISCOFIX

## (undated) DEVICE — PK LAP CHOLE BG

## (undated) DEVICE — APPL CHLORAPREP HI/LITE 26ML ORNG

## (undated) DEVICE — GLV SURG PREMIERPRO ORTHO LTX PF SZ7.5 BRN

## (undated) DEVICE — SOL NACL 0.9PCT 1000ML